# Patient Record
Sex: MALE | Race: WHITE | NOT HISPANIC OR LATINO
[De-identification: names, ages, dates, MRNs, and addresses within clinical notes are randomized per-mention and may not be internally consistent; named-entity substitution may affect disease eponyms.]

---

## 2017-01-12 ENCOUNTER — MEDICATION RENEWAL (OUTPATIENT)
Age: 67
End: 2017-01-12

## 2017-01-27 ENCOUNTER — MEDICATION RENEWAL (OUTPATIENT)
Age: 67
End: 2017-01-27

## 2017-01-30 RX ORDER — ALIROCUMAB 75 MG/ML
75 INJECTION, SOLUTION SUBCUTANEOUS
Qty: 6 | Refills: 3 | Status: DISCONTINUED | COMMUNITY
Start: 2017-01-27 | End: 2017-01-30

## 2017-03-03 ENCOUNTER — APPOINTMENT (OUTPATIENT)
Dept: CARDIOLOGY | Facility: CLINIC | Age: 67
End: 2017-03-03

## 2017-03-03 ENCOUNTER — NON-APPOINTMENT (OUTPATIENT)
Age: 67
End: 2017-03-03

## 2017-03-03 VITALS — SYSTOLIC BLOOD PRESSURE: 140 MMHG | DIASTOLIC BLOOD PRESSURE: 80 MMHG

## 2017-03-03 VITALS
BODY MASS INDEX: 30.61 KG/M2 | DIASTOLIC BLOOD PRESSURE: 77 MMHG | OXYGEN SATURATION: 96 % | SYSTOLIC BLOOD PRESSURE: 155 MMHG | HEIGHT: 72 IN | WEIGHT: 226 LBS | HEART RATE: 68 BPM

## 2017-03-03 DIAGNOSIS — M10.9 GOUT, UNSPECIFIED: ICD-10-CM

## 2017-03-03 RX ORDER — ESCITALOPRAM OXALATE 10 MG/1
10 TABLET, FILM COATED ORAL DAILY
Refills: 0 | Status: DISCONTINUED | COMMUNITY
End: 2017-03-03

## 2017-03-13 ENCOUNTER — RX RENEWAL (OUTPATIENT)
Age: 67
End: 2017-03-13

## 2017-03-13 ENCOUNTER — MEDICATION RENEWAL (OUTPATIENT)
Age: 67
End: 2017-03-13

## 2017-03-15 ENCOUNTER — MEDICATION RENEWAL (OUTPATIENT)
Age: 67
End: 2017-03-15

## 2017-03-16 ENCOUNTER — APPOINTMENT (OUTPATIENT)
Dept: CARDIOLOGY | Facility: CLINIC | Age: 67
End: 2017-03-16

## 2017-03-17 ENCOUNTER — OTHER (OUTPATIENT)
Age: 67
End: 2017-03-17

## 2017-04-24 LAB
A PHAGOCYTOPH IGG TITR SER IF: NORMAL TITER
ALBUMIN SERPL ELPH-MCNC: 4.3 G/DL
ALP BLD-CCNC: 73 U/L
ALT SERPL-CCNC: 19 U/L
ANION GAP SERPL CALC-SCNC: 16 MMOL/L
AST SERPL-CCNC: 16 U/L
B BURGDOR AB SER QL IA: NEGATIVE
B MICROTI IGG TITR SER: NORMAL TITER
BILIRUB SERPL-MCNC: 0.4 MG/DL
BUN SERPL-MCNC: 17 MG/DL
CALCIUM SERPL-MCNC: 9.7 MG/DL
CHLORIDE SERPL-SCNC: 104 MMOL/L
CHOLEST SERPL-MCNC: 156 MG/DL
CHOLEST/HDLC SERPL: 2.9 RATIO
CO2 SERPL-SCNC: 25 MMOL/L
CREAT SERPL-MCNC: 1.09 MG/DL
E CHAFFEENSIS IGG TITR SER IF: NORMAL TITER
GLUCOSE SERPL-MCNC: 117 MG/DL
HBA1C MFR BLD HPLC: 6 %
HDLC SERPL-MCNC: 54 MG/DL
LDLC SERPL CALC-MCNC: 82 MG/DL
POTASSIUM SERPL-SCNC: 4.7 MMOL/L
PROT SERPL-MCNC: 6.7 G/DL
SODIUM SERPL-SCNC: 145 MMOL/L
TRIGL SERPL-MCNC: 101 MG/DL

## 2017-06-09 ENCOUNTER — RX RENEWAL (OUTPATIENT)
Age: 67
End: 2017-06-09

## 2017-07-14 ENCOUNTER — APPOINTMENT (OUTPATIENT)
Dept: DERMATOLOGY | Facility: CLINIC | Age: 67
End: 2017-07-14

## 2017-08-02 ENCOUNTER — RX RENEWAL (OUTPATIENT)
Age: 67
End: 2017-08-02

## 2017-08-18 ENCOUNTER — MEDICATION RENEWAL (OUTPATIENT)
Age: 67
End: 2017-08-18

## 2017-08-29 ENCOUNTER — APPOINTMENT (OUTPATIENT)
Dept: CARDIOLOGY | Facility: CLINIC | Age: 67
End: 2017-08-29
Payer: MEDICARE

## 2017-08-29 ENCOUNTER — NON-APPOINTMENT (OUTPATIENT)
Age: 67
End: 2017-08-29

## 2017-08-29 VITALS
DIASTOLIC BLOOD PRESSURE: 84 MMHG | BODY MASS INDEX: 30.88 KG/M2 | HEART RATE: 78 BPM | OXYGEN SATURATION: 97 % | SYSTOLIC BLOOD PRESSURE: 167 MMHG | WEIGHT: 228 LBS | HEIGHT: 72 IN

## 2017-08-29 VITALS — SYSTOLIC BLOOD PRESSURE: 120 MMHG | DIASTOLIC BLOOD PRESSURE: 78 MMHG

## 2017-08-29 PROCEDURE — 93000 ELECTROCARDIOGRAM COMPLETE: CPT

## 2017-08-29 PROCEDURE — 99214 OFFICE O/P EST MOD 30 MIN: CPT

## 2018-01-11 ENCOUNTER — RX RENEWAL (OUTPATIENT)
Age: 68
End: 2018-01-11

## 2018-03-02 ENCOUNTER — APPOINTMENT (OUTPATIENT)
Dept: CARDIOLOGY | Facility: CLINIC | Age: 68
End: 2018-03-02
Payer: MEDICARE

## 2018-03-02 ENCOUNTER — NON-APPOINTMENT (OUTPATIENT)
Age: 68
End: 2018-03-02

## 2018-03-02 VITALS
HEIGHT: 72 IN | WEIGHT: 230 LBS | SYSTOLIC BLOOD PRESSURE: 145 MMHG | OXYGEN SATURATION: 95 % | DIASTOLIC BLOOD PRESSURE: 82 MMHG | HEART RATE: 65 BPM | BODY MASS INDEX: 31.15 KG/M2

## 2018-03-02 DIAGNOSIS — N13.8 BENIGN PROSTATIC HYPERPLASIA WITH LOWER URINARY TRACT SYMPMS: ICD-10-CM

## 2018-03-02 DIAGNOSIS — E11.42 TYPE 2 DIABETES MELLITUS WITH DIABETIC POLYNEUROPATHY: ICD-10-CM

## 2018-03-02 DIAGNOSIS — N40.1 BENIGN PROSTATIC HYPERPLASIA WITH LOWER URINARY TRACT SYMPMS: ICD-10-CM

## 2018-03-02 DIAGNOSIS — M79.1 MYALGIA: ICD-10-CM

## 2018-03-02 PROCEDURE — 93000 ELECTROCARDIOGRAM COMPLETE: CPT

## 2018-03-02 PROCEDURE — 99214 OFFICE O/P EST MOD 30 MIN: CPT

## 2018-03-02 RX ORDER — ALLOPURINOL 300 MG/1
300 TABLET ORAL DAILY
Refills: 0 | Status: DISCONTINUED | COMMUNITY
End: 2018-03-02

## 2018-03-06 LAB
ALBUMIN SERPL ELPH-MCNC: 4.6 G/DL
ALDOLASE SERPL-CCNC: 6 U/L
ALP BLD-CCNC: 77 U/L
ALT SERPL-CCNC: 14 U/L
ANION GAP SERPL CALC-SCNC: 14 MMOL/L
AST SERPL-CCNC: 15 U/L
BILIRUB SERPL-MCNC: 0.4 MG/DL
BUN SERPL-MCNC: 27 MG/DL
CALCIUM SERPL-MCNC: 9.6 MG/DL
CHLORIDE SERPL-SCNC: 104 MMOL/L
CHOLEST SERPL-MCNC: 174 MG/DL
CHOLEST/HDLC SERPL: 3 RATIO
CK SERPL-CCNC: 161 U/L
CO2 SERPL-SCNC: 26 MMOL/L
CREAT SERPL-MCNC: 1.16 MG/DL
ERYTHROCYTE [SEDIMENTATION RATE] IN BLOOD BY WESTERGREN METHOD: 7 MM/HR
GLUCOSE SERPL-MCNC: 128 MG/DL
HBA1C MFR BLD HPLC: 5.9 %
HDLC SERPL-MCNC: 58 MG/DL
LDLC SERPL CALC-MCNC: 99 MG/DL
POTASSIUM SERPL-SCNC: 4.5 MMOL/L
PROT SERPL-MCNC: 6.8 G/DL
SODIUM SERPL-SCNC: 144 MMOL/L
TRIGL SERPL-MCNC: 85 MG/DL
TSH SERPL-ACNC: 3.12 UIU/ML

## 2018-04-10 ENCOUNTER — RX RENEWAL (OUTPATIENT)
Age: 68
End: 2018-04-10

## 2018-05-04 ENCOUNTER — OTHER (OUTPATIENT)
Age: 68
End: 2018-05-04

## 2018-05-04 ENCOUNTER — APPOINTMENT (OUTPATIENT)
Dept: CHRONIC DISEASE MANAGEMENT | Facility: CLINIC | Age: 68
End: 2018-05-04

## 2018-07-09 ENCOUNTER — RX RENEWAL (OUTPATIENT)
Age: 68
End: 2018-07-09

## 2018-07-24 ENCOUNTER — MOBILE ON CALL (OUTPATIENT)
Age: 68
End: 2018-07-24

## 2018-07-24 RX ORDER — ROSUVASTATIN CALCIUM 20 MG/1
20 TABLET, FILM COATED ORAL DAILY
Qty: 90 | Refills: 0 | Status: COMPLETED | COMMUNITY
End: 2018-07-24

## 2018-08-27 ENCOUNTER — INPATIENT (INPATIENT)
Facility: HOSPITAL | Age: 68
LOS: 0 days | Discharge: HOME | DRG: 287 | End: 2018-08-28
Attending: INTERNAL MEDICINE
Payer: MEDICARE

## 2018-08-27 VITALS
WEIGHT: 229.94 LBS | RESPIRATION RATE: 18 BRPM | OXYGEN SATURATION: 95 % | DIASTOLIC BLOOD PRESSURE: 65 MMHG | HEIGHT: 72 IN | HEART RATE: 65 BPM | SYSTOLIC BLOOD PRESSURE: 137 MMHG | TEMPERATURE: 98 F

## 2018-08-27 DIAGNOSIS — Z90.49 ACQUIRED ABSENCE OF OTHER SPECIFIED PARTS OF DIGESTIVE TRACT: Chronic | ICD-10-CM

## 2018-08-27 DIAGNOSIS — Z98.89 OTHER SPECIFIED POSTPROCEDURAL STATES: Chronic | ICD-10-CM

## 2018-08-27 DIAGNOSIS — Z95.1 PRESENCE OF AORTOCORONARY BYPASS GRAFT: Chronic | ICD-10-CM

## 2018-08-27 DIAGNOSIS — Z95.5 PRESENCE OF CORONARY ANGIOPLASTY IMPLANT AND GRAFT: Chronic | ICD-10-CM

## 2018-08-27 DIAGNOSIS — I20.0 UNSTABLE ANGINA: ICD-10-CM

## 2018-08-27 LAB
ALBUMIN SERPL ELPH-MCNC: 4.3 G/DL — SIGNIFICANT CHANGE UP (ref 3.3–5.2)
ALP SERPL-CCNC: 88 U/L — SIGNIFICANT CHANGE UP (ref 40–120)
ALT FLD-CCNC: 12 U/L — SIGNIFICANT CHANGE UP
ANION GAP SERPL CALC-SCNC: 14 MMOL/L — SIGNIFICANT CHANGE UP (ref 5–17)
AST SERPL-CCNC: 14 U/L — SIGNIFICANT CHANGE UP
BASOPHILS # BLD AUTO: 0 K/UL — SIGNIFICANT CHANGE UP (ref 0–0.2)
BASOPHILS NFR BLD AUTO: 0.3 % — SIGNIFICANT CHANGE UP (ref 0–2)
BILIRUB SERPL-MCNC: 0.3 MG/DL — LOW (ref 0.4–2)
BUN SERPL-MCNC: 21 MG/DL — HIGH (ref 8–20)
CALCIUM SERPL-MCNC: 9.4 MG/DL — SIGNIFICANT CHANGE UP (ref 8.6–10.2)
CHLORIDE SERPL-SCNC: 102 MMOL/L — SIGNIFICANT CHANGE UP (ref 98–107)
CO2 SERPL-SCNC: 27 MMOL/L — SIGNIFICANT CHANGE UP (ref 22–29)
CREAT SERPL-MCNC: 0.88 MG/DL — SIGNIFICANT CHANGE UP (ref 0.5–1.3)
EOSINOPHIL # BLD AUTO: 0.1 K/UL — SIGNIFICANT CHANGE UP (ref 0–0.5)
EOSINOPHIL NFR BLD AUTO: 1.6 % — SIGNIFICANT CHANGE UP (ref 0–6)
GLUCOSE SERPL-MCNC: 115 MG/DL — SIGNIFICANT CHANGE UP (ref 70–115)
HCT VFR BLD CALC: 42.7 % — SIGNIFICANT CHANGE UP (ref 42–52)
HGB BLD-MCNC: 14.1 G/DL — SIGNIFICANT CHANGE UP (ref 14–18)
LYMPHOCYTES # BLD AUTO: 1 K/UL — SIGNIFICANT CHANGE UP (ref 1–4.8)
LYMPHOCYTES # BLD AUTO: 14.1 % — LOW (ref 20–55)
MCHC RBC-ENTMCNC: 28.1 PG — SIGNIFICANT CHANGE UP (ref 27–31)
MCHC RBC-ENTMCNC: 33 G/DL — SIGNIFICANT CHANGE UP (ref 32–36)
MCV RBC AUTO: 85.1 FL — SIGNIFICANT CHANGE UP (ref 80–94)
MONOCYTES # BLD AUTO: 0.4 K/UL — SIGNIFICANT CHANGE UP (ref 0–0.8)
MONOCYTES NFR BLD AUTO: 6.6 % — SIGNIFICANT CHANGE UP (ref 3–10)
NEUTROPHILS # BLD AUTO: 5.2 K/UL — SIGNIFICANT CHANGE UP (ref 1.8–8)
NEUTROPHILS NFR BLD AUTO: 77.1 % — HIGH (ref 37–73)
PLATELET # BLD AUTO: 282 K/UL — SIGNIFICANT CHANGE UP (ref 150–400)
POTASSIUM SERPL-MCNC: 4 MMOL/L — SIGNIFICANT CHANGE UP (ref 3.5–5.3)
POTASSIUM SERPL-SCNC: 4 MMOL/L — SIGNIFICANT CHANGE UP (ref 3.5–5.3)
PROT SERPL-MCNC: 7.2 G/DL — SIGNIFICANT CHANGE UP (ref 6.6–8.7)
RBC # BLD: 5.02 M/UL — SIGNIFICANT CHANGE UP (ref 4.6–6.2)
RBC # FLD: 13.8 % — SIGNIFICANT CHANGE UP (ref 11–15.6)
SODIUM SERPL-SCNC: 143 MMOL/L — SIGNIFICANT CHANGE UP (ref 135–145)
T4 AB SER-ACNC: 7.2 UG/DL — SIGNIFICANT CHANGE UP (ref 4.5–12)
TROPONIN T SERPL-MCNC: <0.01 NG/ML — SIGNIFICANT CHANGE UP (ref 0–0.06)
TSH SERPL-MCNC: 2.31 UIU/ML — SIGNIFICANT CHANGE UP (ref 0.27–4.2)
WBC # BLD: 6.8 K/UL — SIGNIFICANT CHANGE UP (ref 4.8–10.8)
WBC # FLD AUTO: 6.8 K/UL — SIGNIFICANT CHANGE UP (ref 4.8–10.8)

## 2018-08-27 PROCEDURE — 93010 ELECTROCARDIOGRAM REPORT: CPT

## 2018-08-27 PROCEDURE — 71046 X-RAY EXAM CHEST 2 VIEWS: CPT | Mod: 26

## 2018-08-27 PROCEDURE — 99285 EMERGENCY DEPT VISIT HI MDM: CPT

## 2018-08-27 PROCEDURE — 99497 ADVNCD CARE PLAN 30 MIN: CPT | Mod: 25

## 2018-08-27 PROCEDURE — 99223 1ST HOSP IP/OBS HIGH 75: CPT | Mod: GC

## 2018-08-27 RX ORDER — ENOXAPARIN SODIUM 100 MG/ML
40 INJECTION SUBCUTANEOUS DAILY
Qty: 0 | Refills: 0 | Status: DISCONTINUED | OUTPATIENT
Start: 2018-08-28 | End: 2018-08-28

## 2018-08-27 RX ORDER — ACETAMINOPHEN 500 MG
650 TABLET ORAL ONCE
Qty: 0 | Refills: 0 | Status: COMPLETED | OUTPATIENT
Start: 2018-08-27 | End: 2018-08-27

## 2018-08-27 RX ORDER — SODIUM CHLORIDE 9 MG/ML
3 INJECTION INTRAMUSCULAR; INTRAVENOUS; SUBCUTANEOUS ONCE
Qty: 0 | Refills: 0 | Status: COMPLETED | OUTPATIENT
Start: 2018-08-27 | End: 2018-08-27

## 2018-08-27 RX ORDER — ASPIRIN/CALCIUM CARB/MAGNESIUM 324 MG
81 TABLET ORAL DAILY
Qty: 0 | Refills: 0 | Status: DISCONTINUED | OUTPATIENT
Start: 2018-08-28 | End: 2018-08-28

## 2018-08-27 RX ORDER — SODIUM CHLORIDE 9 MG/ML
1000 INJECTION INTRAMUSCULAR; INTRAVENOUS; SUBCUTANEOUS
Qty: 0 | Refills: 0 | Status: DISCONTINUED | OUTPATIENT
Start: 2018-08-27 | End: 2018-08-28

## 2018-08-27 RX ORDER — ATORVASTATIN CALCIUM 80 MG/1
40 TABLET, FILM COATED ORAL AT BEDTIME
Qty: 0 | Refills: 0 | Status: DISCONTINUED | OUTPATIENT
Start: 2018-08-27 | End: 2018-08-28

## 2018-08-27 RX ORDER — CLOPIDOGREL BISULFATE 75 MG/1
75 TABLET, FILM COATED ORAL DAILY
Qty: 0 | Refills: 0 | Status: DISCONTINUED | OUTPATIENT
Start: 2018-08-27 | End: 2018-08-28

## 2018-08-27 RX ORDER — METOPROLOL TARTRATE 50 MG
25 TABLET ORAL DAILY
Qty: 0 | Refills: 0 | Status: DISCONTINUED | OUTPATIENT
Start: 2018-08-28 | End: 2018-08-28

## 2018-08-27 RX ORDER — METOPROLOL TARTRATE 50 MG
25 TABLET ORAL ONCE
Qty: 0 | Refills: 0 | Status: COMPLETED | OUTPATIENT
Start: 2018-08-27 | End: 2018-08-27

## 2018-08-27 RX ORDER — FINASTERIDE 5 MG/1
1 TABLET, FILM COATED ORAL
Qty: 0 | Refills: 0 | COMMUNITY

## 2018-08-27 RX ADMIN — SODIUM CHLORIDE 3 MILLILITER(S): 9 INJECTION INTRAMUSCULAR; INTRAVENOUS; SUBCUTANEOUS at 15:08

## 2018-08-27 RX ADMIN — Medication 25 MILLIGRAM(S): at 21:42

## 2018-08-27 RX ADMIN — ATORVASTATIN CALCIUM 40 MILLIGRAM(S): 80 TABLET, FILM COATED ORAL at 21:41

## 2018-08-27 RX ADMIN — CLOPIDOGREL BISULFATE 75 MILLIGRAM(S): 75 TABLET, FILM COATED ORAL at 21:41

## 2018-08-27 RX ADMIN — Medication 650 MILLIGRAM(S): at 21:41

## 2018-08-27 RX ADMIN — Medication 650 MILLIGRAM(S): at 22:37

## 2018-08-27 NOTE — ED STATDOCS - PSH
H/O coronary artery bypass surgery  x4 vessels  S/P appendectomy    S/P cholecystectomy    S/P drug eluting coronary stent placement    S/P eye surgery

## 2018-08-27 NOTE — ED PROVIDER NOTE - OBJECTIVE STATEMENT
67 year old male with pmh including cad s/p stent x 3 and cabg x 4, hld, ht, hyperthyroidism, hx of left eye vision problems secondary to previous trauma coming to  ED with increasing SOB x 1 week. Patient stating starting today has been having chest pressure in center of chest that radiated to left upper arm as well. states normally able to work out and lift weights and does so 3 times a week but currently tough secondary to sob. states last time had similar symptoms had 2 stents placed.

## 2018-08-27 NOTE — ED PROVIDER NOTE - PROGRESS NOTE DETAILS
Cardiology paged. labs reviewed; given patient hx of stents last time with similar symptoms awaiting call back from cardiology spoke to cardiology Dr Bates will see patient Dr Vega spoke to Dr Bates patient for cath in AM; spoke to Elvis will admit patient for unstable angina; trop q8 ordered x 2

## 2018-08-27 NOTE — ED PROVIDER NOTE - CARE PLAN
Principal Discharge DX:	Unstable angina  Goal:	to not have further sob or cp  Assessment and plan of treatment:	admit to hospital for cath in am

## 2018-08-27 NOTE — ED ADULT NURSE NOTE - NSIMPLEMENTINTERV_GEN_ALL_ED
Implemented All Universal Safety Interventions:  [place] to call system. Call bell, personal items and telephone within reach. Instruct patient to call for assistance. Room bathroom lighting operational. Non-slip footwear when patient is off stretcher. Physically safe environment: no spills, clutter or unnecessary equipment. Stretcher in lowest position, wheels locked, appropriate side rails in place.

## 2018-08-27 NOTE — H&P ADULT - HISTORY OF PRESENT ILLNESS
67 year old male with PMH including CAD s/p stent x 3 (2014, 2016) and CABG (2013), HLD, HTN, Hyperthyroidism (Grave's Disease), coming to  ED with increasing chest pain x2-3 days and SOB. Patient states that 2 weeks ago he started having intermittent SOB on exertion/light lifting which has gotten worse over the past week, Patient also with central chest pain starting 3 days ago, described as moderate-severe tightening and pressure, radiating to left arm, which was initially on minimal exertion but now at rest, partially relieved when he lies down. Patient states normally able to work out and lift weights and does so 3 times a week but not able to over past 2 weeks. Patient also admits to having some PND, denies orthopnea, fevers, chills, palpitations abdominal pain, constipation, diarrhea, sick contacts, recent travel, cocaine use.

## 2018-08-27 NOTE — ED PROVIDER NOTE - FAMILY HISTORY
Family history of coronary artery disease     Family history of acute myocardial infarction     Sibling  Still living? Yes, Estimated age: 51-60  Family history of coronary artery disease, Age at diagnosis: 51-60

## 2018-08-27 NOTE — ED ADULT NURSE REASSESSMENT NOTE - NS ED NURSE REASSESS COMMENT FT1
patient resting comfortably in bed.  Denies any pain or discomfort at this time.  Will continue to monitor.

## 2018-08-27 NOTE — CONSULT NOTE ADULT - SUBJECTIVE AND OBJECTIVE BOX
CARDIOLOGY CONSULTATION NOTE ([facility])  Consult requested by:      Reason for Consultation:     History obtained by: Patient and medical record     obtained: No    Chief complaint:    Patient is a 67y old  Male who presents with a chief complaint of chest pain    HPI:    68 y/o male with PMHx of CAD s/p prior CABG (LIMA to LAD, SVG to OM1 occluded in 2016, SVG to RCA , SVG to D1 ) s/p 2 MARCELINA to LM and CX.   He presented with 2 weeks history of recurrent episodes of retrosternal chest tightness radiating to the left shoulder , unrelated to exertion , associated with SOB, lasted for minutes sometimes up to 1 hour. Symptoms similar to his symptoms prior to last PCI .   He has PMHx of HTN, Hyperlipidemia.    REVIEW OF SYMPTOMS: Cardiovascular:  as per HPI;  Respiratory:  as per HPI  Genitourinary:  No dysuria, no hematuria; Gastrointestinal:  No nausea, no vomiting. No diarrhea.  No abdominal pain. No dark color stool, no melena ; Neurological: No headache, no dizziness, no slurred speech;  Psychiatric: No agitation, no anxiety.  ALL OTHER REVIEW OF SYSTEMS ARE NEGATIVE.    ALLERGIES: Allergies    No Known Allergies    Intolerances    Home Medications:   * Patient Currently Takes Medications as of 08-Apr-2016 09:06 documented in Structured Notes  · 	aspirin 325 mg oral tablet: 1 tab(s) orally once a day  · 	pantoprazole 40 mg oral delayed release tablet: 1 tab(s) orally once a day (before a meal)  · 	metoprolol: 12.5 milligram(s) orally once a day  · 	Plavix 75 mg oral tablet: 1 tab(s) orally once a day  · 	atorvastatin 40 mg oral tablet: 1 tab(s) orally once a day (at bedtime)  · 	methimazole 5 mg oral tablet:  orally once a day    PAST MEDICAL HISTORY  Left eye trauma  Renal colic  Hypertension  Hypercholesterolemia  Coronary artery disease  Hyperthyroidism      PAST SURGICAL HISTORY  S/P cholecystectomy  S/P drug eluting coronary stent placement  S/P eye surgery  S/P appendectomy  H/O coronary artery bypass surgery      FAMILY HISTORY:  Family history of coronary artery disease (Sibling)  Family history of acute myocardial infarction  Family history of coronary artery disease      SOCIAL HISTORY:  former smoker      Vital Signs Last 24 Hrs  T(C): 36.7 (27 Aug 2018 19:17), Max: 36.7 (27 Aug 2018 14:01)  T(F): 98 (27 Aug 2018 19:17), Max: 98.1 (27 Aug 2018 14:01)  HR: 63 (27 Aug 2018 19:17) (55 - 65)  BP: 155/74 (27 Aug 2018 19:17) (137/65 - 155/74)  BP(mean): --  RR: 18 (27 Aug 2018 19:17) (18 - 18)  SpO2: 97% (27 Aug 2018 19:17) (95% - 97%)      PHYSICAL EXAM:  Constitutional: Comfortable . No acute distress.   HEENT: Atraumatic and normcephalic , neck is supple . no JVD. No carotid bruit.   CNS: A&Ox3. No focal deficits.  Lymph Nodes: Cervical : Not palpable.  Respiratory: CTAB  Cardiovascular: S1S2 RRR. No murmur/rubs or gallop.  Gastrointestinal: Soft non-tender and non distended . +Bowel sounds, no HSM  Extremities: No edema , DP and PT +2   Psychiatric: Calm . no agitation., mood appropriate  Skin: No skin lesions    Intake and output:     LABS:                        14.1   6.8   )-----------( 282      ( 27 Aug 2018 15:44 )             42.7     08-27    143  |  102  |  21.0<H>  ----------------------------<  115  4.0   |  27.0  |  0.88    Ca    9.4      27 Aug 2018 15:44    TPro  7.2  /  Alb  4.3  /  TBili  0.3<L>  /  DBili  x   /  AST  14  /  ALT  12  /  AlkPhos  88  08-27    CARDIAC MARKERS ( 27 Aug 2018 15:44 )  x     / <0.01 ng/mL / x     / x     / x        ;p-BNP=      ECG: pending    RADIOLOGY & ADDITIONAL STUDIES:    X-ray:   reviewed by me , no cardiomegaly, no pulmonary congestion        Catheterization Findings  VENTRICLES: EF estimated was 45 %.  CORONARY VESSELS: The coronary circulation is right dominant.  LM:   --  LM: There was a 70 % stenosis.  LAD:   --  Proximal LAD: There was a diffuse 90 % stenosis.  --  Mid LAD: There was a 100 % stenosis.  --  Distal LAD: There was a 60 % stenosis. Fills via LIMA.  CX:   --  OM1: There was a 70 % stenosis.  RI:   --  Ramus intermedius: There was a 95 % stenosis.  RCA:   --  Proximal RCA: There was a diffuse 60 % stenosis.  --  Distal RCA: There was a 100 % stenosis. Fills via SVG.  GRAFTS:   --  Graft to the LAD: The graft was a LIMA. Graft angiography  showed no evidence of disease.  --  Graft to the 1st diagonal: The graft was a saphenous vein graft. Graft  angiography showed no evidence of disease.  --  Graft to the 1st obtuse marginal: The graft was a saphenous vein graft.  Occluded.  --  Graft to the distal RCA: The graft was a saphenous vein graft. Graft  angiography showed no evidence of disease.  COMPLICATIONS: No complications occurred during the cath lab visit.  DIAGNOSTIC IMPRESSIONS: Severe multivessel disease. Patent LIMA to LAD, SVG  to Diagonal and SVG to RCA. Occluded SVG to circumflex. New distal OM  disease- PCI with 1 MARCELINA. New left main disease (by IVUS) - PCI with 1 MARCELINA.  LVEDP [id number].  DIAGNOSTIC RECOMMENDATIONS: Aspirin and Plavix.  If recurrent sxs of CHF, consider low dose diuretic.  INTERVENTIONAL IMPRESSIONS: Severe multivessel disease. Patent LIMA to LAD,  SVG to Diagonal and SVG to RCA. Occluded SVG to circumflex. New distal OM  disease- PCI with 1 MARCELINA. New left main disease (by IVUS) - PCI with 1 MARCELINA.  LVEDP [id number].  INTERVENTIONAL RECOMMENDATIONS: Aspirin and Plavix.  If recurrent sxs of CHF, consider low dose diuretic.  Prepared and signed by  Nasir Michael MD

## 2018-08-27 NOTE — ED STATDOCS - PMH
Coronary artery disease    Hypercholesterolemia    Hypertension    Hyperthyroidism    Left eye trauma  limited vision  Renal colic

## 2018-08-27 NOTE — H&P ADULT - NSHPPHYSICALEXAM_GEN_ALL_CORE
T(C): 36.7 (08-27-18 @ 19:17), Max: 36.7 (08-27-18 @ 14:01)  HR: 58 (08-27-18 @ 21:43) (55 - 65)  BP: 158/74 (08-27-18 @ 21:43) (137/65 - 158/74)  RR: 18 (08-27-18 @ 21:43) (18 - 18)  SpO2: 98% (08-27-18 @ 21:43) (95% - 98%)    Physical Exam:   GENERAL: well-groomed, well-developed, NAD  HEENT: head NC/AT; EOM intact, PERRLA, conjunctiva & sclera clear; hearing grossly intact, no nasal congestion or discharge, no sinus tenderness, no tonsillar erythema or exudates, moist mucous membranes, good dentition  NECK: supple, no JVD, no thyromegaly  RESPIRATORY: CTA B/L, no wheezing, rales, rhonchi or rubs  CARDIOVASCULAR: S1&S2, RRR, no murmurs or gallops  ABDOMEN: soft, non-tender, non-distended, BS+, no hernias, no hepatosplenomegaly, no CVA tenderness  MUSCULOSKELETAL: no muscle atrophy, no clubbing, cyanosis or edema of extremities, no calf tenderness   LYMPH: no lymphadenopathy  VASCULAR: peripheral pulses 2+, capillary refill < 2 seconds, no varicose veins   SKIN: No rashes, bruises or scars   NEUROLOGIC: AA&O X3, CN2-12 intact w/ no focal deficits, no sensory loss, motor Strength 5/5 in UE & LE B/L, DTRs 2+/4 intact B/L, normal gait T(C): 36.7 (08-27-18 @ 19:17), Max: 36.7 (08-27-18 @ 14:01)  HR: 58 (08-27-18 @ 21:43) (55 - 65)  BP: 158/74 (08-27-18 @ 21:43) (137/65 - 158/74)  RR: 18 (08-27-18 @ 21:43) (18 - 18)  SpO2: 98% (08-27-18 @ 21:43) (95% - 98%)    Physical Exam:   GENERAL: well-groomed, well-developed, NAD  HEENT: head NC/AT; EOM intact, PERRLA, conjunctiva & sclera clear; hearing grossly intact, no nasal congestion or discharge, no sinus tenderness, no tonsillar erythema or exudates, moist mucous membranes, good dentition  NECK: supple, no JVD, no thyromegaly  RESPIRATORY: CTA B/L, no wheezing, rales, rhonchi or rubs  CARDIOVASCULAR: S1&S2, RRR, no murmurs or gallops  GI: soft, non-tender, non-distended, BS+, no hernias, no hepatosplenomegaly, no CVA tenderness  MUSCULOSKELETAL: no muscle atrophy, no clubbing, cyanosis or edema of extremities, no calf tenderness   LYMPH: no lymphadenopathy  VASCULAR: peripheral pulses 2+, capillary refill < 2 seconds, no varicose veins   SKIN: No rashes, bruises or scars   NEUROLOGIC: AA&O X3, CN2-12 intact w/ no focal deficits, no sensory loss, motor Strength 5/5 in UE & LE B/L, DTRs 2+/4 intact B/L, normal gait

## 2018-08-27 NOTE — ED PROVIDER NOTE - EYES NEGATIVE STATEMENT, MLM
no discharge, no irritation, no pain, no redness, and no visual changes. no discharge, no irritation, no pain, no redness, and no visual changes (has left eye vision problems from childhood injury).

## 2018-08-27 NOTE — H&P ADULT - NSHPSOCIALHISTORY_GEN_ALL_CORE
Previous smoker for 5-10 years, last smoke 30+ years ago, Occasional alcohol use, denies any illicit drug use.

## 2018-08-27 NOTE — ED ADULT NURSE NOTE - OBJECTIVE STATEMENT
c/o chest pain radiating to Left shoulder.  reports hx of CABGx4 in 2013 and cardiac stents placed after that.  A+ox4, no s/s of distress, denies discomfort at this time.  no short of breath, no dizziness.  No edema noted

## 2018-08-27 NOTE — H&P ADULT - ASSESSMENT
67 year old male with PMH including CAD s/p stent x 3 (2014, 2016) and CABG (2013), HLD, HTN, Hyperthyroidism (Grave's Disease), coming to  ED with increasing chest pain x2-3 days and SOB, admitted to rule out ACS. Patient evaluated by  cardiology in the ED, likely for cardiac cath in the AM.    Unstable Angina  >Admit to medicine-resident service under Dr. Hamilton  >Bed:  Telemetry  >Diet:  DASH/TLC, NPO after midnight tonight for cath in AM  >Activity: Bedrest for now  >Nursing: vitals per routine  >IV fluids: NS at 125 cc/hr, BUN elevated and  in anticipation of contrast for procedure tomorrow  >Labs: CBC, CMP, PT/INR, PTT, Mg, Phos in AM  >Imaging: CXR unremarkable  >Consults: [facility]  > O2 to maintain O2 Sat > 92%  > Aspirin 81mg PO (patient already took today), Lipitor 40 mg, Metoprolol 25 mg, lisinopril if BP remains elevated   > Pain control: nitroglycerin 0.4mg SL PRN chest pain x3  > serial cardiac enzymes, troponins negative x1, will trend  Q6-8H x3, HbA1c, lipid panel  > EKG, TTE    CAD  s/p CABG in 2014, stents x3 last in 2016   c/w with ASA, Plavix BB, statin  Management as per above    Hyperthyroidism 2/2 Graves Disease  check TSH and T4  c/w home Methimazole 5mg    Preventative Measure:  DVT ppx: SCD while in bed, Lovenox 40 mg 67 year old male with PMH including CAD s/p stent x 3 (2014, 2016) and CABG (2013), HLD, HTN, Hyperthyroidism (Grave's Disease), coming to  ED with increasing chest pain x2-3 days and SOB, admitted to rule out ACS. Patient evaluated by  cardiology in the ED, likely for cardiac cath in the AM.    Unstable Angina  >Admit to medicine-resident service under Dr. Hamilton  >Bed:  Telemetry  >Diet:  DASH/TLC, NPO after midnight tonight for cath in AM  >Activity: Bedrest for now  >Nursing: vitals per routine  >IV fluids: NS at 125 cc/hr, BUN elevated and  in anticipation of contrast for procedure tomorrow  >Labs: CBC, CMP, PT/INR, PTT, Mg, Phos in AM  >Imaging: CXR unremarkable  >Consults: [facility]  > O2 to maintain O2 Sat > 92%  > Aspirin 81mg PO (patient already took today), Lipitor 40 mg, Metoprolol 25 mg, lisinopril if BP remains elevated   > Pain control: nitroglycerin 0.4mg SL PRN chest pain x3  > serial cardiac enzymes, troponins negative x1, will trend  Q6-8H x3, HbA1c, lipid panel  > EKG, TTE    CAD  s/p CABG in 2014, stents x3 last in 2016   c/w with ASA, Plavix BB, statin  Management as per above    Hyperthyroidism 2/2 Graves Disease  TSH and T4 wnl  c/w home Methimazole 5mg    Preventative Measure:  DVT ppx: SCD while in bed, Lovenox 40 mg 67 year old male with PMH including CAD s/p stent x 3 (2014, 2016) and CABG (2013), HLD, HTN, Hyperthyroidism (Grave's Disease), coming to  ED with increasing chest pain x2-3 days and SOB, admitted to rule out ACS. Patient evaluated by  cardiology in the ED, likely for cardiac cath in the AM.    Unstable Angina with moderate risk for ACS  >Admit to medicine-resident service under Dr. Hamilton  >Bed:  Telemetry  >Diet:  DASH/TLC, NPO after midnight tonight for cath in AM  >Activity: Bedrest for now  >Nursing: vitals per routine  >IV fluids: NS at 125 cc/hr, BUN elevated and  in anticipation of contrast for procedure tomorrow  >Labs: CBC, CMP, PT/INR, PTT, Mg, Phos in AM  >Imaging: CXR unremarkable  >Consults:  Cardiology  > O2 to maintain O2 Sat > 92%  > Aspirin 81mg PO (patient already took today), Lipitor 40 mg, Metoprolol 25 mg, lisinopril if BP remains elevated   > Pain control: nitroglycerin 0.4mg SL PRN chest pain x3  > serial cardiac enzymes, troponins negative x1, will trend  Q6-8H x3, HbA1c, lipid panel  > EKG, TTE    CAD s/p PCI and CABG  s/p CABG in 2014, stents x3 last in 2016   c/w with ASA, Plavix BB, statin  Management as per above    Hyperthyroidism 2/2 Graves Disease  TSH and T4 wnl  c/w home Methimazole 5mg    Preventative Measure:  DVT ppx: SCD while in bed, heparin sq q8hrs 67 year old male with PMH including CAD s/p stent x 3 (2014, 2016) and CABG (2013), HLD, HTN, Hyperthyroidism (Grave's Disease), coming to  ED with increasing chest pain x2-3 days and SOB, admitted to rule out ACS. Patient evaluated by  cardiology in the ED, likely for cardiac cath in the AM.    Unstable Angina with moderate risk for ACS  >Admit to medicine service  >Bed:  Telemetry  >Diet:  DASH/TLC, NPO after midnight tonight for cath in AM  >Activity: Bedrest for now  >Nursing: vitals per routine  >IV fluids: NS at 125 cc/hr, BUN elevated and  in anticipation of contrast for procedure tomorrow  >Labs: CBC, CMP, PT/INR, PTT, Mg, Phos in AM  >Imaging: CXR unremarkable  >Consults:  Cardiology  > O2 to maintain O2 Sat > 92%  > Aspirin 81mg PO (patient already took today), Lipitor 40 mg, Metoprolol 25 mg, lisinopril if BP remains elevated   > Pain control: nitroglycerin 0.4mg SL PRN chest pain x3  > serial cardiac enzymes, troponins negative x1, will trend  Q6-8H x3, HbA1c, lipid panel  > EKG, TTE    CAD s/p PCI and CABG  s/p CABG in 2014, stents x3 last in 2016   c/w with ASA, Plavix BB, statin  Management as per above    Hyperthyroidism 2/2 Graves Disease  TSH and T4 wnl  c/w home Methimazole 5mg    Preventative Measure:  DVT ppx: SCD while in bed, heparin sq q8hrs

## 2018-08-27 NOTE — ED STATDOCS - PROGRESS NOTE DETAILS
68 y/o male with a hx of CAD, HTN, coronary artery bypass, and stent placement presents to the ED c/o CP which onset today. He also notes that he was SOB since last week. Pt notes mild chest discomfort at this time. Pt to be moved to main ED. Protocol orders entered.

## 2018-08-27 NOTE — H&P ADULT - ATTENDING COMMENTS
30 min time spent discussing advanced care planning including code status, existence of health care proxy, plan of treatment, consultants if called, and prognosis. Pt in agreement with above, all questions answered and concerns addressed.    FULL CODE  agrees to cardiac cath in AM  agrees to NPO

## 2018-08-27 NOTE — ED PROVIDER NOTE - MEDICAL DECISION MAKING DETAILS
labs reviewed; given patient hx of stents last time with similar symptoms awaiting call back from cardiology

## 2018-08-27 NOTE — ED PROVIDER NOTE - ATTENDING CONTRIBUTION TO CARE
58 year old with hx of HTN and CAD s/p 3 stent placement in 2016 who presents to the ER c/o intermittent chest pain.  The pain began about 1 week ago as chest pain with exertion but now patient states for the past day he has been experiencing chest pain at rest associated with mild SOB.  He states that the pain is similar to when he needed stents a couple years ago.  Patient denies fever and URI symptoms.  He is well appearing, no acute distress, lungs clear, no hypoxia.  Due to patient's hx of CAD cardiology was consulted.  EKG non-ischemic, 1st set negative.  Dr. Mercado at bedside recommeds admission for cardiac catheterization tomorrow.

## 2018-08-27 NOTE — CONSULT NOTE ADULT - ASSESSMENT
66 y/o male with progressive anginal symptoms and SOB FC III , with history of CAD s/p CABG x 4 ( occluded SVG to OM1 , other grafts patent) s/p PCI to LM and CX   TNI -ve , obtain a 2nd set, continuous telemetry,   Unstable angina for LHC/coronary angiogram tomorrow.    HTN uncontrolled, continue current meds BB , can add lisinopril 20 mg daily if BP remains uncontrolled    Hyperlipidemia continue atorvastatin 40 mg

## 2018-08-27 NOTE — ED PROVIDER NOTE - SKIN, MLM
Skin normal color for race, warm, dry and intact. No evidence of rash.; +healed midline chest scar seen secondary to open heart surgery; +tattoos

## 2018-08-28 ENCOUNTER — TRANSCRIPTION ENCOUNTER (OUTPATIENT)
Age: 68
End: 2018-08-28

## 2018-08-28 VITALS
HEART RATE: 52 BPM | OXYGEN SATURATION: 98 % | RESPIRATION RATE: 16 BRPM | SYSTOLIC BLOOD PRESSURE: 130 MMHG | DIASTOLIC BLOOD PRESSURE: 83 MMHG

## 2018-08-28 LAB
ANION GAP SERPL CALC-SCNC: 11 MMOL/L — SIGNIFICANT CHANGE UP (ref 5–17)
APTT BLD: 31.2 SEC — SIGNIFICANT CHANGE UP (ref 27.5–37.4)
BUN SERPL-MCNC: 19 MG/DL — SIGNIFICANT CHANGE UP (ref 8–20)
CALCIUM SERPL-MCNC: 8.8 MG/DL — SIGNIFICANT CHANGE UP (ref 8.6–10.2)
CHLORIDE SERPL-SCNC: 103 MMOL/L — SIGNIFICANT CHANGE UP (ref 98–107)
CHOLEST SERPL-MCNC: 148 MG/DL — SIGNIFICANT CHANGE UP (ref 110–199)
CO2 SERPL-SCNC: 27 MMOL/L — SIGNIFICANT CHANGE UP (ref 22–29)
CREAT SERPL-MCNC: 0.9 MG/DL — SIGNIFICANT CHANGE UP (ref 0.5–1.3)
GLUCOSE SERPL-MCNC: 106 MG/DL — SIGNIFICANT CHANGE UP (ref 70–115)
HBA1C BLD-MCNC: 5.8 % — HIGH (ref 4–5.6)
HCT VFR BLD CALC: 42.1 % — SIGNIFICANT CHANGE UP (ref 42–52)
HDLC SERPL-MCNC: 50 MG/DL — SIGNIFICANT CHANGE UP
HGB BLD-MCNC: 13.6 G/DL — LOW (ref 14–18)
INR BLD: 1.07 RATIO — SIGNIFICANT CHANGE UP (ref 0.88–1.16)
LIPID PNL WITH DIRECT LDL SERPL: 76 MG/DL — SIGNIFICANT CHANGE UP
MAGNESIUM SERPL-MCNC: 2.1 MG/DL — SIGNIFICANT CHANGE UP (ref 1.6–2.6)
MCHC RBC-ENTMCNC: 27.6 PG — SIGNIFICANT CHANGE UP (ref 27–31)
MCHC RBC-ENTMCNC: 32.3 G/DL — SIGNIFICANT CHANGE UP (ref 32–36)
MCV RBC AUTO: 85.6 FL — SIGNIFICANT CHANGE UP (ref 80–94)
PHOSPHATE SERPL-MCNC: 3.7 MG/DL — SIGNIFICANT CHANGE UP (ref 2.4–4.7)
PLATELET # BLD AUTO: 255 K/UL — SIGNIFICANT CHANGE UP (ref 150–400)
POTASSIUM SERPL-MCNC: 4 MMOL/L — SIGNIFICANT CHANGE UP (ref 3.5–5.3)
POTASSIUM SERPL-SCNC: 4 MMOL/L — SIGNIFICANT CHANGE UP (ref 3.5–5.3)
PROTHROM AB SERPL-ACNC: 11.8 SEC — SIGNIFICANT CHANGE UP (ref 9.8–12.7)
RBC # BLD: 4.92 M/UL — SIGNIFICANT CHANGE UP (ref 4.6–6.2)
RBC # FLD: 13.9 % — SIGNIFICANT CHANGE UP (ref 11–15.6)
SODIUM SERPL-SCNC: 141 MMOL/L — SIGNIFICANT CHANGE UP (ref 135–145)
TOTAL CHOLESTEROL/HDL RATIO MEASUREMENT: 3 RATIO — LOW (ref 3.4–9.6)
TRIGL SERPL-MCNC: 112 MG/DL — SIGNIFICANT CHANGE UP (ref 10–200)
TROPONIN T SERPL-MCNC: <0.01 NG/ML — SIGNIFICANT CHANGE UP (ref 0–0.06)
TROPONIN T SERPL-MCNC: <0.01 NG/ML — SIGNIFICANT CHANGE UP (ref 0–0.06)
WBC # BLD: 6.3 K/UL — SIGNIFICANT CHANGE UP (ref 4.8–10.8)
WBC # FLD AUTO: 6.3 K/UL — SIGNIFICANT CHANGE UP (ref 4.8–10.8)

## 2018-08-28 PROCEDURE — 99238 HOSP IP/OBS DSCHRG MGMT 30/<: CPT

## 2018-08-28 PROCEDURE — 93010 ELECTROCARDIOGRAM REPORT: CPT

## 2018-08-28 RX ORDER — NITROGLYCERIN 6.5 MG
0.4 CAPSULE, EXTENDED RELEASE ORAL
Qty: 0 | Refills: 0 | Status: DISCONTINUED | OUTPATIENT
Start: 2018-08-28 | End: 2018-08-28

## 2018-08-28 RX ORDER — HEPARIN SODIUM 5000 [USP'U]/ML
5000 INJECTION INTRAVENOUS; SUBCUTANEOUS EVERY 8 HOURS
Qty: 0 | Refills: 0 | Status: DISCONTINUED | OUTPATIENT
Start: 2018-08-28 | End: 2018-08-28

## 2018-08-28 RX ORDER — LISINOPRIL 2.5 MG/1
20 TABLET ORAL DAILY
Qty: 0 | Refills: 0 | Status: DISCONTINUED | OUTPATIENT
Start: 2018-08-28 | End: 2018-08-28

## 2018-08-28 RX ADMIN — CLOPIDOGREL BISULFATE 75 MILLIGRAM(S): 75 TABLET, FILM COATED ORAL at 07:13

## 2018-08-28 RX ADMIN — Medication 81 MILLIGRAM(S): at 07:13

## 2018-08-28 RX ADMIN — LISINOPRIL 20 MILLIGRAM(S): 2.5 TABLET ORAL at 03:39

## 2018-08-28 NOTE — DISCHARGE NOTE ADULT - CARE PLAN
Principal Discharge DX:	Coronary artery disease  Goal:	optimal cardiac function  Assessment and plan of treatment:	No heavy lifting, driving, sex, tub baths, swimming, or any activity that submerges the lower half of the body in water for 48 hours.  Limited walking and stairs for 48 hours.    Change the bandaid after 24 hours and every 24 hours after that.  Keep the puncture site dry and covered with a bandaid until a scab forms.    Observe the site frequently.  If bleeding or a large lump (the size of a golf ball or bigger) occurs lie flat, apply continuous direct pressure just above the puncture site for at least 10 minutes, and notify your physician immediately.  If the bleeding cannot be controlled, call 911 immediately for assistance.  Notify your physician of pain, swelling or any drainage.    Notify your physician immediately if coldness, numbness, discoloration or pain in your foot occurs. Principal Discharge DX:	Coronary artery disease  Goal:	optimal cardiac function  Assessment and plan of treatment:	No heavy lifting, driving, sex, tub baths, swimming, or any activity that submerges the lower half of the body in water for 48 hours.  Limited walking and stairs for 48 hours.    Change the bandaid after 24 hours and every 24 hours after that.  Keep the puncture site dry and covered with a bandaid until a scab forms.    Observe the site frequently.  If bleeding or a large lump (the size of a golf ball or bigger) occurs lie flat, apply continuous direct pressure just above the puncture site for at least 10 minutes, and notify your physician immediately.  If the bleeding cannot be controlled, call 911 immediately for assistance.  Notify your physician of pain, swelling or any drainage.    Notify your physician immediately if coldness, numbness, discoloration or pain in your foot occurs.  Secondary Diagnosis:	Hypertension  Assessment and plan of treatment:	c/w home medication  Secondary Diagnosis:	Hypercholesterolemia  Assessment and plan of treatment:	c/w statin  Secondary Diagnosis:	Hyperthyroidism  Assessment and plan of treatment:	c/w home medication

## 2018-08-28 NOTE — DISCHARGE NOTE ADULT - MEDICATION SUMMARY - MEDICATIONS TO TAKE
I will START or STAY ON the medications listed below when I get home from the hospital:    aspirin 81 mg oral tablet, chewable  -- 1 tab(s) by mouth once a day  -- Indication: For antiplatelet    rosuvastatin 20 mg oral tablet  -- 1 tab(s) by mouth once a day (at bedtime)  -- Indication: For hyperlipdiemia    Plavix 75 mg oral tablet  -- 1 tab(s) by mouth once a day  -- Indication: For antiplatelet    methimazole 5 mg oral tablet  --  by mouth once a day  -- Indication: For thyroid    metoprolol succinate 25 mg oral tablet, extended release  -- 1 tab(s) by mouth once a day  -- Indication: For heart

## 2018-08-28 NOTE — DISCHARGE NOTE ADULT - PATIENT PORTAL LINK FT
You can access the [facility] Patient Portal, offered by [facility], by registering with the following website: [email or web address]

## 2018-08-28 NOTE — PROGRESS NOTE ADULT - SUBJECTIVE AND OBJECTIVE BOX
Nurse Practitioner Progress note:     INTERVAL HISTORY: 67 year old male with significant cardiac history with c/o chest pain.    MEDICATIONS:  lisinopril 20 milliGRAM(s) Oral daily  metoprolol succinate ER 25 milliGRAM(s) Oral daily  nitroglycerin     SubLingual 0.4 milliGRAM(s) SubLingual every 5 minutes PRN  atorvastatin 40 milliGRAM(s) Oral at bedtime  methimazole 5 milliGRAM(s) Oral daily  aspirin  chewable 81 milliGRAM(s) Oral daily  clopidogrel Tablet 75 milliGRAM(s) Oral daily  heparin  Injectable 5000 Unit(s) SubCutaneous every 8 hours      TELEMETRY: SB 52 bpm    T(C): 36.7 (08-28-18 @ 06:45), Max: 36.7 (08-27-18 @ 14:01)  HR: 54 (08-28-18 @ 09:45) (53 - 86)  BP: 134/62 (08-28-18 @ 09:45) (134/62 - 167/64)  RR: 18 (08-28-18 @ 09:45) (18 - 20)  SpO2: 98% (08-28-18 @ 09:45) (95% - 98%)  Wt(kg): --    PHYSICAL EXAM:  Appearance: Normal	  HEENT:   Normal oral mucosa, PERRL  Cardiovascular: Normal S1 S2, No JVD, No murmurs, No edema  Respiratory: Lungs clear to auscultation	  Psychiatry: A & O x 3, Mood & affect appropriate  Gastrointestinal:  Soft, Non-tender, + BS	  Skin: No rashes, No ecchymoses, No cyanosis  Neurologic: Non-focal, A&O X3.  No neuro deficits  Extremities: Normal range of motion, No clubbing, cyanosis or edema  Vascular: Peripheral pulses palpable 2+ bilaterally  Procedure Site: Right groin site benign.  No bleeding/hematoma/ecchymosis.  + palp pedal pulse.      PROCEDURE RESULTS: S/P LHC which revealed patent grafts and stents via right groin  No treatment required. (full report to follow)    ASSESSMENT/PLAN: 	  -Groin precautions  -Bedrest X 2h  -Resume home meds  -Follow up with Dr. Oviedo  -Pt cleared for discharge from cardiac standpoint  -Spoke with Dr. Bright and will discharge patient today.

## 2018-08-28 NOTE — DISCHARGE NOTE ADULT - HOSPITAL COURSE
67 year old male with h/o CABG and cardiac stents who c/o chest pain.  Now s/p LHC which revealed patent grafts and stents.  No treatment required. Cont home meds.  Discharge to home This is 67 year old male with h/o CABG and cardiac stents who c/o chest pain, serial troponin negative,  Now s/p LHC which revealed patent grafts and stents, feeling better, denied chest pain, SOB, clear by cardiology to discharge home. Pt wife present bedside, updated, answer all questions.    ICU Vital Signs Last 24 Hrs  T(C): 36.7 (28 Aug 2018 06:45), Max: 36.7 (27 Aug 2018 14:01)  T(F): 98 (28 Aug 2018 06:45), Max: 98.1 (27 Aug 2018 14:01)  HR: 52 (28 Aug 2018 10:15) (52 - 86)  BP: 132/67 (28 Aug 2018 10:15) (130/64 - 167/64)  RR: 18 (28 Aug 2018 10:15) (18 - 20)  SpO2: 95% (28 Aug 2018 10:15) (95% - 98%)    PHYSICAL EXAM:    GENERAL: NAD  CHEST/LUNG: positive air entry   HEART: s1/s2 audible  ABDOMEN: Soft, Nontender, Nondistended; Bowel sounds present  EXTREMITIES:  no edema

## 2018-08-28 NOTE — DISCHARGE NOTE ADULT - PLAN OF CARE
optimal cardiac function No heavy lifting, driving, sex, tub baths, swimming, or any activity that submerges the lower half of the body in water for 48 hours.  Limited walking and stairs for 48 hours.    Change the bandaid after 24 hours and every 24 hours after that.  Keep the puncture site dry and covered with a bandaid until a scab forms.    Observe the site frequently.  If bleeding or a large lump (the size of a golf ball or bigger) occurs lie flat, apply continuous direct pressure just above the puncture site for at least 10 minutes, and notify your physician immediately.  If the bleeding cannot be controlled, call 911 immediately for assistance.  Notify your physician of pain, swelling or any drainage.    Notify your physician immediately if coldness, numbness, discoloration or pain in your foot occurs. c/w home medication c/w statin

## 2018-08-29 ENCOUNTER — APPOINTMENT (OUTPATIENT)
Dept: CARDIOLOGY | Facility: CLINIC | Age: 68
End: 2018-08-29
Payer: MEDICARE

## 2018-08-29 PROCEDURE — 93880 EXTRACRANIAL BILAT STUDY: CPT

## 2018-08-30 ENCOUNTER — RESULT REVIEW (OUTPATIENT)
Age: 68
End: 2018-08-30

## 2018-09-07 ENCOUNTER — APPOINTMENT (OUTPATIENT)
Dept: CARDIOLOGY | Facility: CLINIC | Age: 68
End: 2018-09-07
Payer: MEDICARE

## 2018-09-07 ENCOUNTER — APPOINTMENT (OUTPATIENT)
Dept: CHRONIC DISEASE MANAGEMENT | Facility: CLINIC | Age: 68
End: 2018-09-07

## 2018-09-07 ENCOUNTER — NON-APPOINTMENT (OUTPATIENT)
Age: 68
End: 2018-09-07

## 2018-09-07 VITALS — DIASTOLIC BLOOD PRESSURE: 88 MMHG | SYSTOLIC BLOOD PRESSURE: 150 MMHG

## 2018-09-07 VITALS
WEIGHT: 230 LBS | DIASTOLIC BLOOD PRESSURE: 90 MMHG | HEART RATE: 61 BPM | BODY MASS INDEX: 31.15 KG/M2 | OXYGEN SATURATION: 95 % | SYSTOLIC BLOOD PRESSURE: 150 MMHG | HEIGHT: 72 IN

## 2018-09-07 VITALS — DIASTOLIC BLOOD PRESSURE: 86 MMHG | SYSTOLIC BLOOD PRESSURE: 148 MMHG

## 2018-09-07 DIAGNOSIS — Z77.090 CONTACT WITH AND (SUSPECTED) EXPOSURE TO ASBESTOS: ICD-10-CM

## 2018-09-07 PROCEDURE — 93000 ELECTROCARDIOGRAM COMPLETE: CPT

## 2018-09-07 PROCEDURE — 99215 OFFICE O/P EST HI 40 MIN: CPT

## 2018-09-24 ENCOUNTER — NON-APPOINTMENT (OUTPATIENT)
Age: 68
End: 2018-09-24

## 2018-09-24 ENCOUNTER — LABORATORY RESULT (OUTPATIENT)
Age: 68
End: 2018-09-24

## 2018-09-24 ENCOUNTER — APPOINTMENT (OUTPATIENT)
Dept: PULMONOLOGY | Facility: CLINIC | Age: 68
End: 2018-09-24
Payer: MEDICARE

## 2018-09-24 VITALS
HEIGHT: 72 IN | HEART RATE: 75 BPM | BODY MASS INDEX: 31.15 KG/M2 | RESPIRATION RATE: 18 BRPM | TEMPERATURE: 98.2 F | OXYGEN SATURATION: 96 % | WEIGHT: 230 LBS | DIASTOLIC BLOOD PRESSURE: 82 MMHG | SYSTOLIC BLOOD PRESSURE: 128 MMHG

## 2018-09-24 PROCEDURE — 99204 OFFICE O/P NEW MOD 45 MIN: CPT | Mod: 25

## 2018-09-24 PROCEDURE — 36415 COLL VENOUS BLD VENIPUNCTURE: CPT

## 2018-09-24 PROCEDURE — 94010 BREATHING CAPACITY TEST: CPT

## 2018-09-25 LAB
T3 SERPL-MCNC: 119 NG/DL
T3RU NFR SERPL: 1.05 INDEX
T4 FREE SERPL-MCNC: 1.4 NG/DL
T4 SERPL-MCNC: 8 UG/DL
TSH SERPL-ACNC: 1.92 UIU/ML

## 2018-09-26 ENCOUNTER — APPOINTMENT (OUTPATIENT)
Dept: PULMONOLOGY | Facility: CLINIC | Age: 68
End: 2018-09-26
Payer: MEDICARE

## 2018-09-26 VITALS — BODY MASS INDEX: 29.7 KG/M2 | WEIGHT: 219 LBS

## 2018-09-26 PROCEDURE — 94060 EVALUATION OF WHEEZING: CPT

## 2018-09-26 PROCEDURE — 85018 HEMOGLOBIN: CPT | Mod: QW

## 2018-09-26 PROCEDURE — 94729 DIFFUSING CAPACITY: CPT

## 2018-09-26 PROCEDURE — 94727 GAS DIL/WSHOT DETER LNG VOL: CPT

## 2018-09-26 PROCEDURE — 94664 DEMO&/EVAL PT USE INHALER: CPT | Mod: 59

## 2018-10-05 ENCOUNTER — NON-APPOINTMENT (OUTPATIENT)
Age: 68
End: 2018-10-05

## 2018-10-05 ENCOUNTER — APPOINTMENT (OUTPATIENT)
Dept: CARDIOLOGY | Facility: CLINIC | Age: 68
End: 2018-10-05
Payer: MEDICARE

## 2018-10-05 VITALS — DIASTOLIC BLOOD PRESSURE: 84 MMHG | SYSTOLIC BLOOD PRESSURE: 138 MMHG

## 2018-10-05 VITALS
DIASTOLIC BLOOD PRESSURE: 94 MMHG | HEIGHT: 72 IN | SYSTOLIC BLOOD PRESSURE: 144 MMHG | WEIGHT: 222 LBS | OXYGEN SATURATION: 98 % | BODY MASS INDEX: 30.07 KG/M2 | HEART RATE: 60 BPM

## 2018-10-05 VITALS — SYSTOLIC BLOOD PRESSURE: 150 MMHG | DIASTOLIC BLOOD PRESSURE: 86 MMHG

## 2018-10-05 PROCEDURE — 93000 ELECTROCARDIOGRAM COMPLETE: CPT

## 2018-10-05 PROCEDURE — 99214 OFFICE O/P EST MOD 30 MIN: CPT

## 2018-10-07 ENCOUNTER — RX RENEWAL (OUTPATIENT)
Age: 68
End: 2018-10-07

## 2018-10-10 ENCOUNTER — APPOINTMENT (OUTPATIENT)
Dept: CARDIOLOGY | Facility: CLINIC | Age: 68
End: 2018-10-10
Payer: MEDICARE

## 2018-10-10 PROCEDURE — 93306 TTE W/DOPPLER COMPLETE: CPT

## 2018-10-25 PROCEDURE — 80048 BASIC METABOLIC PNL TOTAL CA: CPT

## 2018-10-25 PROCEDURE — 76937 US GUIDE VASCULAR ACCESS: CPT

## 2018-10-25 PROCEDURE — 99285 EMERGENCY DEPT VISIT HI MDM: CPT

## 2018-10-25 PROCEDURE — 83036 HEMOGLOBIN GLYCOSYLATED A1C: CPT

## 2018-10-25 PROCEDURE — C1887: CPT

## 2018-10-25 PROCEDURE — 84436 ASSAY OF TOTAL THYROXINE: CPT

## 2018-10-25 PROCEDURE — 93459 L HRT ART/GRFT ANGIO: CPT

## 2018-10-25 PROCEDURE — 84443 ASSAY THYROID STIM HORMONE: CPT

## 2018-10-25 PROCEDURE — 36415 COLL VENOUS BLD VENIPUNCTURE: CPT

## 2018-10-25 PROCEDURE — 99152 MOD SED SAME PHYS/QHP 5/>YRS: CPT

## 2018-10-25 PROCEDURE — 71046 X-RAY EXAM CHEST 2 VIEWS: CPT

## 2018-10-25 PROCEDURE — 85610 PROTHROMBIN TIME: CPT

## 2018-10-25 PROCEDURE — C1894: CPT

## 2018-10-25 PROCEDURE — 80061 LIPID PANEL: CPT

## 2018-10-25 PROCEDURE — 84484 ASSAY OF TROPONIN QUANT: CPT

## 2018-10-25 PROCEDURE — 85027 COMPLETE CBC AUTOMATED: CPT

## 2018-10-25 PROCEDURE — 84100 ASSAY OF PHOSPHORUS: CPT

## 2018-10-25 PROCEDURE — 85730 THROMBOPLASTIN TIME PARTIAL: CPT

## 2018-10-25 PROCEDURE — C1769: CPT

## 2018-10-25 PROCEDURE — 83735 ASSAY OF MAGNESIUM: CPT

## 2018-10-25 PROCEDURE — 80053 COMPREHEN METABOLIC PANEL: CPT

## 2018-10-26 ENCOUNTER — RECORD ABSTRACTING (OUTPATIENT)
Age: 68
End: 2018-10-26

## 2018-10-29 ENCOUNTER — RESULT REVIEW (OUTPATIENT)
Age: 68
End: 2018-10-29

## 2018-10-30 ENCOUNTER — APPOINTMENT (OUTPATIENT)
Dept: ENDOCRINOLOGY | Facility: CLINIC | Age: 68
End: 2018-10-30
Payer: MEDICARE

## 2018-10-30 VITALS
WEIGHT: 215 LBS | HEIGHT: 72 IN | BODY MASS INDEX: 29.12 KG/M2 | SYSTOLIC BLOOD PRESSURE: 120 MMHG | HEART RATE: 58 BPM | DIASTOLIC BLOOD PRESSURE: 70 MMHG

## 2018-10-30 PROCEDURE — 99214 OFFICE O/P EST MOD 30 MIN: CPT

## 2018-10-30 RX ORDER — METOPROLOL SUCCINATE 25 MG/1
25 TABLET, EXTENDED RELEASE ORAL DAILY
Qty: 180 | Refills: 3 | Status: DISCONTINUED | COMMUNITY
Start: 2017-08-02 | End: 2018-10-30

## 2018-11-02 ENCOUNTER — RESULT REVIEW (OUTPATIENT)
Age: 68
End: 2018-11-02

## 2018-11-02 ENCOUNTER — APPOINTMENT (OUTPATIENT)
Dept: PULMONOLOGY | Facility: CLINIC | Age: 68
End: 2018-11-02
Payer: MEDICARE

## 2018-11-02 VITALS
TEMPERATURE: 97.8 F | DIASTOLIC BLOOD PRESSURE: 76 MMHG | SYSTOLIC BLOOD PRESSURE: 124 MMHG | OXYGEN SATURATION: 97 % | RESPIRATION RATE: 17 BRPM | HEART RATE: 74 BPM

## 2018-11-02 DIAGNOSIS — J45.909 UNSPECIFIED ASTHMA, UNCOMPLICATED: ICD-10-CM

## 2018-11-02 LAB
ALBUMIN SERPL ELPH-MCNC: 4.7 G/DL
ALP BLD-CCNC: 97 U/L
ALT SERPL-CCNC: 19 U/L
ANION GAP SERPL CALC-SCNC: 13 MMOL/L
AST SERPL-CCNC: 19 U/L
BILIRUB SERPL-MCNC: 0.4 MG/DL
BUN SERPL-MCNC: 19 MG/DL
CALCIUM SERPL-MCNC: 9.7 MG/DL
CHLORIDE SERPL-SCNC: 101 MMOL/L
CO2 SERPL-SCNC: 28 MMOL/L
CREAT SERPL-MCNC: 0.98 MG/DL
GLUCOSE SERPL-MCNC: 133 MG/DL
POTASSIUM SERPL-SCNC: 4.8 MMOL/L
PROT SERPL-MCNC: 6.9 G/DL
SODIUM SERPL-SCNC: 142 MMOL/L

## 2018-11-02 PROCEDURE — 99214 OFFICE O/P EST MOD 30 MIN: CPT

## 2018-12-14 ENCOUNTER — RX RENEWAL (OUTPATIENT)
Age: 68
End: 2018-12-14

## 2019-02-05 ENCOUNTER — MEDICATION RENEWAL (OUTPATIENT)
Age: 69
End: 2019-02-05

## 2019-03-19 NOTE — PATIENT PROFILE ADULT. - PRO MENTAL HEALTH SX RECENT
Detail Level: Zone Plan: Patient will start topical cream this year after knee surgery.   Discussed with patient to follow up about 1 month after topical treatment Initiate Treatment: Efudex 5 % topical cream to face, tops of hands, forearms, BID for 2 weeks on, 2 weeks off, then 2 weeks on\\nhydrocortisone 2.5 % topical ointment to affected areas on face, hands, arms and upper back BID for irritation from Efudex. apply 30 min after efudex as directed Render In Strict Bullet Format?: No none

## 2019-03-25 ENCOUNTER — APPOINTMENT (OUTPATIENT)
Dept: PULMONOLOGY | Facility: CLINIC | Age: 69
End: 2019-03-25

## 2019-04-01 ENCOUNTER — MEDICATION RENEWAL (OUTPATIENT)
Age: 69
End: 2019-04-01

## 2019-04-09 ENCOUNTER — APPOINTMENT (OUTPATIENT)
Dept: ENDOCRINOLOGY | Facility: CLINIC | Age: 69
End: 2019-04-09
Payer: MEDICARE

## 2019-04-09 VITALS
BODY MASS INDEX: 31.02 KG/M2 | HEART RATE: 82 BPM | SYSTOLIC BLOOD PRESSURE: 130 MMHG | DIASTOLIC BLOOD PRESSURE: 80 MMHG | WEIGHT: 229 LBS | HEIGHT: 72 IN

## 2019-04-09 PROCEDURE — 99213 OFFICE O/P EST LOW 20 MIN: CPT

## 2019-04-09 NOTE — HISTORY OF PRESENT ILLNESS
[FreeTextEntry1] : Pt here for follow up Graves disease Thyroid nodule PreDM -\par \par seeing urology  PSA increased again \par given antibiotic \par  for follow up in June\par \par  started exercsing after broke ankle in jan hunting

## 2019-04-09 NOTE — PHYSICAL EXAM
[Alert] : alert [No Acute Distress] : no acute distress [Well Developed] : well developed [Well Nourished] : well nourished [Normal Sclera/Conjunctiva] : normal sclera/conjunctiva [EOMI] : extra ocular movement intact [No Proptosis] : no proptosis [No Respiratory Distress] : no respiratory distress [Clear to Auscultation] : lungs were clear to auscultation bilaterally [Normal Rate] : heart rate was normal  [No Accessory Muscle Use] : no accessory muscle use [Normal S1, S2] : normal S1 and S2 [Regular Rhythm] : with a regular rhythm [No Edema] : there was no peripheral edema [Post Cervical Nodes] : posterior cervical nodes [Anterior Cervical Nodes] : anterior cervical nodes [Normal] : normal and non tender [Normal Gait] : normal gait [Normal Strength/Tone] : muscle strength and tone were normal [No Rash] : no rash [No Tremors] : no tremors [Normal Reflexes] : deep tendon reflexes were 2+ and symmetric [Oriented x3] : oriented to person, place, and time [Acanthosis Nigricans] : no acanthosis nigricans [de-identified] : mildly enalrged thyroid Right thryoid nodule  Neg alton

## 2019-04-09 NOTE — ASSESSMENT
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

## 2019-04-12 ENCOUNTER — NON-APPOINTMENT (OUTPATIENT)
Age: 69
End: 2019-04-12

## 2019-04-12 ENCOUNTER — APPOINTMENT (OUTPATIENT)
Dept: CARDIOLOGY | Facility: CLINIC | Age: 69
End: 2019-04-12
Payer: MEDICARE

## 2019-04-12 VITALS
WEIGHT: 229 LBS | DIASTOLIC BLOOD PRESSURE: 70 MMHG | OXYGEN SATURATION: 97 % | HEIGHT: 72 IN | BODY MASS INDEX: 31.02 KG/M2 | HEART RATE: 76 BPM | RESPIRATION RATE: 16 BRPM | SYSTOLIC BLOOD PRESSURE: 118 MMHG

## 2019-04-12 DIAGNOSIS — N52.2 DRUG-INDUCED ERECTILE DYSFUNCTION: ICD-10-CM

## 2019-04-12 PROCEDURE — 99214 OFFICE O/P EST MOD 30 MIN: CPT

## 2019-04-12 PROCEDURE — 93000 ELECTROCARDIOGRAM COMPLETE: CPT

## 2019-04-12 RX ORDER — IBUPROFEN 800 MG/1
800 TABLET, FILM COATED ORAL
Qty: 270 | Refills: 0 | Status: DISCONTINUED | COMMUNITY
Start: 2017-10-23 | End: 2019-04-12

## 2019-04-12 RX ORDER — FINASTERIDE 5 MG/1
5 TABLET, FILM COATED ORAL
Qty: 90 | Refills: 0 | Status: DISCONTINUED | COMMUNITY
Start: 2018-02-08 | End: 2019-04-12

## 2019-06-06 ENCOUNTER — RX RENEWAL (OUTPATIENT)
Age: 69
End: 2019-06-06

## 2019-06-21 LAB
CHOLEST SERPL-MCNC: 153 MG/DL
CHOLEST/HDLC SERPL: 2.7 RATIO
HBA1C MFR BLD HPLC: 6 %
HDLC SERPL-MCNC: 56 MG/DL
LDLC SERPL CALC-MCNC: 84 MG/DL
TRIGL SERPL-MCNC: 64 MG/DL

## 2019-09-02 NOTE — HISTORY OF PRESENT ILLNESS
[FreeTextEntry1] :  67 year old man with history of prediabetes, coronary disease S/P CABG in [place] who noted atypical chest pain and had an abnormal stress test back in 2012.  Cardiac cath in March 2014 after recurrent chest pain with patent LIMA to LAD, patent SVG to D2 and RPDA but closed graft to LCX and 90% native LCx lesion.  He underwent MARCELINA stent to native LCx and is doing well.  He denies chest pain and dyspnea.  Additionally, he was found to be hyperthyroid and is now on methimazole for treatment.  He tolerated cholecystectomy and hernia repair and had returned to the gym.  He has decided to continue with methimazole for the hyperthyroidism.  He could no longer afford the Repatha so is now on Crestor and tolerating it with recent LDL 84 mg/dL. In early August, he noted three weeks of exertional dyspnea.  He denied chest pain and felt it was related to the humidity but went to the ER at [facility].  Cardiac catheterization was performed with patent grafts and EF 40% with inferior HK which is unchanged from prior echos but a gradual decline from 2014.  He now feels well and is back to the gym, exercising without symptoms.  His last TSH was at goal by report but he is to see endocrine this month.   He has been diagnosed with prediabetes by the endocrinologist.  Carotid Dopplers with mild to moderate athersclerosis bilaterally.  He broke his ankle in December and is now back to the gym with no significant chest pain or dyspnea.  He continues to use a bone stimulator.  He is complaining of erectile dysfunction and tried Viagra which made him feel presyncopal.  He is tolerating Crestor with no myalgias.  Lipid levels at [facility].  He was recently treated with antibiotics for prostatiitis. . \par \par \par

## 2019-09-02 NOTE — REVIEW OF SYSTEMS
[Recent Weight Loss (___ Lbs)] : recent [unfilled] ~Ulb weight loss [see HPI] : see HPI [Negative] : Endocrine [Dyspnea on exertion] : not dyspnea during exertion

## 2019-09-02 NOTE — PHYSICAL EXAM
[General Appearance - Well Developed] : well developed [Well Groomed] : well groomed [General Appearance - Well Nourished] : well nourished [Normal Appearance] : normal appearance [Normal Conjunctiva] : the conjunctiva exhibited no abnormalities [General Appearance - In No Acute Distress] : no acute distress [No Oral Cyanosis] : no oral cyanosis [No Oral Pallor] : no oral pallor [No Jugular Venous Bueno A Waves] : no jugular venous bueno A waves [Normal Jugular Venous V Waves Present] : normal jugular venous V waves present [Respiration, Rhythm And Depth] : normal respiratory rhythm and effort [Exaggerated Use Of Accessory Muscles For Inspiration] : no accessory muscle use [Heart Sounds] : normal S1 and S2 [Heart Rate And Rhythm] : heart rate and rhythm were normal [Auscultation Breath Sounds / Voice Sounds] : lungs were clear to auscultation bilaterally [Murmurs] : no murmurs present [Edema] : no peripheral edema present [Abdomen Soft] : soft [Abdomen Tenderness] : non-tender [Abnormal Walk] : normal gait [Nail Clubbing] : no clubbing of the fingernails [Skin Turgor] : normal skin turgor [Cyanosis, Localized] : no localized cyanosis [] : no rash [Oriented To Time, Place, And Person] : oriented to person, place, and time [Impaired Insight] : insight and judgment were intact [Affect] : the affect was normal [Memory Recent] : recent memory was not impaired [FreeTextEntry1] : PT pulses bilaterally 1+

## 2019-09-16 ENCOUNTER — RX RENEWAL (OUTPATIENT)
Age: 69
End: 2019-09-16

## 2019-09-17 ENCOUNTER — RX RENEWAL (OUTPATIENT)
Age: 69
End: 2019-09-17

## 2019-10-10 ENCOUNTER — APPOINTMENT (OUTPATIENT)
Dept: ENDOCRINOLOGY | Facility: CLINIC | Age: 69
End: 2019-10-10
Payer: MEDICARE

## 2019-10-10 VITALS
OXYGEN SATURATION: 97 % | HEART RATE: 77 BPM | SYSTOLIC BLOOD PRESSURE: 132 MMHG | WEIGHT: 231 LBS | HEIGHT: 72 IN | DIASTOLIC BLOOD PRESSURE: 80 MMHG | BODY MASS INDEX: 31.29 KG/M2

## 2019-10-10 DIAGNOSIS — L73.8 OTHER SPECIFIED FOLLICULAR DISORDERS: ICD-10-CM

## 2019-10-10 PROCEDURE — 99214 OFFICE O/P EST MOD 30 MIN: CPT

## 2019-10-13 PROBLEM — L73.8 FOLLICULITIS BARBAE: Status: ACTIVE | Noted: 2019-10-10

## 2019-10-13 NOTE — HISTORY OF PRESENT ILLNESS
[FreeTextEntry1] : Pt here for follow up Graves disease Thyroid nodule PreDM -\par \par seeing urology  PSA increased again \par but now resolved after prostatits  treated  back to normal \par  broke ankle in Jan - had to  have surgery - about 4 weeks ago

## 2019-10-13 NOTE — ASSESSMENT
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

## 2019-10-13 NOTE — PHYSICAL EXAM
[Alert] : alert [No Acute Distress] : no acute distress [Well Nourished] : well nourished [Well Developed] : well developed [Normal Sclera/Conjunctiva] : normal sclera/conjunctiva [EOMI] : extra ocular movement intact [No Proptosis] : no proptosis [No Respiratory Distress] : no respiratory distress [No Accessory Muscle Use] : no accessory muscle use [Clear to Auscultation] : lungs were clear to auscultation bilaterally [Normal Rate] : heart rate was normal  [Normal S1, S2] : normal S1 and S2 [Regular Rhythm] : with a regular rhythm [No Edema] : there was no peripheral edema [Post Cervical Nodes] : posterior cervical nodes [Anterior Cervical Nodes] : anterior cervical nodes [Normal] : normal and non tender [Normal Gait] : normal gait [Normal Strength/Tone] : muscle strength and tone were normal [Normal Reflexes] : deep tendon reflexes were 2+ and symmetric [No Tremors] : no tremors [Oriented x3] : oriented to person, place, and time [Acanthosis Nigricans] : no acanthosis nigricans [de-identified] : mildly enalrged thyroid Right thryoid nodule  Neg alton  [de-identified] : irritation/redness side burn area bilaterally

## 2019-10-17 ENCOUNTER — APPOINTMENT (OUTPATIENT)
Dept: ENDOCRINOLOGY | Facility: CLINIC | Age: 69
End: 2019-10-17

## 2019-10-23 ENCOUNTER — MEDICATION RENEWAL (OUTPATIENT)
Age: 69
End: 2019-10-23

## 2019-11-08 ENCOUNTER — APPOINTMENT (OUTPATIENT)
Dept: CARDIOLOGY | Facility: CLINIC | Age: 69
End: 2019-11-08
Payer: MEDICARE

## 2019-11-08 ENCOUNTER — NON-APPOINTMENT (OUTPATIENT)
Age: 69
End: 2019-11-08

## 2019-11-08 VITALS
OXYGEN SATURATION: 98 % | DIASTOLIC BLOOD PRESSURE: 62 MMHG | RESPIRATION RATE: 16 BRPM | HEIGHT: 72 IN | WEIGHT: 230 LBS | HEART RATE: 62 BPM | SYSTOLIC BLOOD PRESSURE: 116 MMHG | BODY MASS INDEX: 31.15 KG/M2

## 2019-11-08 DIAGNOSIS — R06.02 SHORTNESS OF BREATH: ICD-10-CM

## 2019-11-08 DIAGNOSIS — R53.83 OTHER FATIGUE: ICD-10-CM

## 2019-11-08 DIAGNOSIS — I49.9 CARDIAC ARRHYTHMIA, UNSPECIFIED: ICD-10-CM

## 2019-11-08 PROCEDURE — 99215 OFFICE O/P EST HI 40 MIN: CPT

## 2019-11-08 PROCEDURE — 93000 ELECTROCARDIOGRAM COMPLETE: CPT

## 2019-11-11 ENCOUNTER — APPOINTMENT (OUTPATIENT)
Dept: CARDIOLOGY | Facility: CLINIC | Age: 69
End: 2019-11-11
Payer: MEDICARE

## 2019-11-11 PROCEDURE — 93306 TTE W/DOPPLER COMPLETE: CPT

## 2019-11-12 ENCOUNTER — APPOINTMENT (OUTPATIENT)
Dept: CARDIOLOGY | Facility: CLINIC | Age: 69
End: 2019-11-12
Payer: MEDICARE

## 2019-11-12 PROCEDURE — A9500: CPT

## 2019-11-12 PROCEDURE — 78452 HT MUSCLE IMAGE SPECT MULT: CPT

## 2019-11-12 PROCEDURE — 93015 CV STRESS TEST SUPVJ I&R: CPT

## 2019-12-03 ENCOUNTER — RX RENEWAL (OUTPATIENT)
Age: 69
End: 2019-12-03

## 2019-12-06 ENCOUNTER — OUTPATIENT (OUTPATIENT)
Dept: OUTPATIENT SERVICES | Facility: HOSPITAL | Age: 69
LOS: 1 days | End: 2019-12-06
Payer: MEDICARE

## 2019-12-06 VITALS
SYSTOLIC BLOOD PRESSURE: 138 MMHG | WEIGHT: 233.91 LBS | HEART RATE: 70 BPM | RESPIRATION RATE: 18 BRPM | TEMPERATURE: 98 F | HEIGHT: 72 IN | OXYGEN SATURATION: 97 % | DIASTOLIC BLOOD PRESSURE: 66 MMHG

## 2019-12-06 DIAGNOSIS — Z90.49 ACQUIRED ABSENCE OF OTHER SPECIFIED PARTS OF DIGESTIVE TRACT: Chronic | ICD-10-CM

## 2019-12-06 DIAGNOSIS — Z01.810 ENCOUNTER FOR PREPROCEDURAL CARDIOVASCULAR EXAMINATION: ICD-10-CM

## 2019-12-06 DIAGNOSIS — Z95.5 PRESENCE OF CORONARY ANGIOPLASTY IMPLANT AND GRAFT: Chronic | ICD-10-CM

## 2019-12-06 DIAGNOSIS — Z95.1 PRESENCE OF AORTOCORONARY BYPASS GRAFT: Chronic | ICD-10-CM

## 2019-12-06 DIAGNOSIS — Z98.89 OTHER SPECIFIED POSTPROCEDURAL STATES: Chronic | ICD-10-CM

## 2019-12-06 LAB
ANION GAP SERPL CALC-SCNC: 12 MMOL/L — SIGNIFICANT CHANGE UP (ref 5–17)
APTT BLD: 34.6 SEC — SIGNIFICANT CHANGE UP (ref 27.5–36.3)
BASOPHILS # BLD AUTO: 0.03 K/UL — SIGNIFICANT CHANGE UP (ref 0–0.2)
BASOPHILS NFR BLD AUTO: 0.4 % — SIGNIFICANT CHANGE UP (ref 0–2)
BUN SERPL-MCNC: 18 MG/DL — SIGNIFICANT CHANGE UP (ref 8–20)
CALCIUM SERPL-MCNC: 9.1 MG/DL — SIGNIFICANT CHANGE UP (ref 8.6–10.2)
CHLORIDE SERPL-SCNC: 103 MMOL/L — SIGNIFICANT CHANGE UP (ref 98–107)
CO2 SERPL-SCNC: 25 MMOL/L — SIGNIFICANT CHANGE UP (ref 22–29)
CREAT SERPL-MCNC: 0.9 MG/DL — SIGNIFICANT CHANGE UP (ref 0.5–1.3)
EOSINOPHIL # BLD AUTO: 0.17 K/UL — SIGNIFICANT CHANGE UP (ref 0–0.5)
EOSINOPHIL NFR BLD AUTO: 2.5 % — SIGNIFICANT CHANGE UP (ref 0–6)
GLUCOSE SERPL-MCNC: 116 MG/DL — HIGH (ref 70–115)
HCT VFR BLD CALC: 41.7 % — SIGNIFICANT CHANGE UP (ref 39–50)
HGB BLD-MCNC: 13.4 G/DL — SIGNIFICANT CHANGE UP (ref 13–17)
IMM GRANULOCYTES NFR BLD AUTO: 0.3 % — SIGNIFICANT CHANGE UP (ref 0–1.5)
INR BLD: 1.03 RATIO — SIGNIFICANT CHANGE UP (ref 0.88–1.16)
LYMPHOCYTES # BLD AUTO: 1.07 K/UL — SIGNIFICANT CHANGE UP (ref 1–3.3)
LYMPHOCYTES # BLD AUTO: 15.9 % — SIGNIFICANT CHANGE UP (ref 13–44)
MAGNESIUM SERPL-MCNC: 1.9 MG/DL — SIGNIFICANT CHANGE UP (ref 1.6–2.6)
MCHC RBC-ENTMCNC: 27.6 PG — SIGNIFICANT CHANGE UP (ref 27–34)
MCHC RBC-ENTMCNC: 32.1 GM/DL — SIGNIFICANT CHANGE UP (ref 32–36)
MCV RBC AUTO: 85.8 FL — SIGNIFICANT CHANGE UP (ref 80–100)
MONOCYTES # BLD AUTO: 0.46 K/UL — SIGNIFICANT CHANGE UP (ref 0–0.9)
MONOCYTES NFR BLD AUTO: 6.8 % — SIGNIFICANT CHANGE UP (ref 2–14)
NEUTROPHILS # BLD AUTO: 4.98 K/UL — SIGNIFICANT CHANGE UP (ref 1.8–7.4)
NEUTROPHILS NFR BLD AUTO: 74.1 % — SIGNIFICANT CHANGE UP (ref 43–77)
PLATELET # BLD AUTO: 279 K/UL — SIGNIFICANT CHANGE UP (ref 150–400)
POTASSIUM SERPL-MCNC: 4 MMOL/L — SIGNIFICANT CHANGE UP (ref 3.5–5.3)
POTASSIUM SERPL-SCNC: 4 MMOL/L — SIGNIFICANT CHANGE UP (ref 3.5–5.3)
PROTHROM AB SERPL-ACNC: 11.8 SEC — SIGNIFICANT CHANGE UP (ref 10–12.9)
RBC # BLD: 4.86 M/UL — SIGNIFICANT CHANGE UP (ref 4.2–5.8)
RBC # FLD: 13.5 % — SIGNIFICANT CHANGE UP (ref 10.3–14.5)
SODIUM SERPL-SCNC: 140 MMOL/L — SIGNIFICANT CHANGE UP (ref 135–145)
WBC # BLD: 6.73 K/UL — SIGNIFICANT CHANGE UP (ref 3.8–10.5)
WBC # FLD AUTO: 6.73 K/UL — SIGNIFICANT CHANGE UP (ref 3.8–10.5)

## 2019-12-06 PROCEDURE — 83735 ASSAY OF MAGNESIUM: CPT

## 2019-12-06 PROCEDURE — 85027 COMPLETE CBC AUTOMATED: CPT

## 2019-12-06 PROCEDURE — 93005 ELECTROCARDIOGRAM TRACING: CPT

## 2019-12-06 PROCEDURE — 80048 BASIC METABOLIC PNL TOTAL CA: CPT

## 2019-12-06 PROCEDURE — 36415 COLL VENOUS BLD VENIPUNCTURE: CPT

## 2019-12-06 PROCEDURE — G0463: CPT

## 2019-12-06 PROCEDURE — 85610 PROTHROMBIN TIME: CPT

## 2019-12-06 PROCEDURE — 85730 THROMBOPLASTIN TIME PARTIAL: CPT

## 2019-12-06 PROCEDURE — 93010 ELECTROCARDIOGRAM REPORT: CPT

## 2019-12-06 NOTE — H&P PST ADULT - ASSESSMENT
This a 67 year old male PMH:Pt with atypical chest pain and abnormal stress in 2012  s/p CABG in [place] 2012. Recurrent chest pain and Angina   3/2014 with cardiac cath : Patent LIMA - LAD, patent SVG - D2 , RPDA, SVG - LCX closed . Native LCX 90 % lesion and received a MARCELINA stent native LCX .  3/7/16 Cardiac cath : Patent LIMA- LAD Patent SVG - Diagonal , patent SVG - RCA: Occluded SVG - LCX; OM1 - 70 % stenosis -MARCELINA resolute 2.5x12  , New LM 70 % disease s/p MARCELINA Resolute 4.0x15 , EDP 25   8/28/19 Cardiac cath : Patent LIMA-LAD , Patebt SVG - D1 , Patent SVG - RCA, Patent Stents - OM1 / LCX EF 40 %   He presented again to cardiologist 11/8/19 with recurrent episodes of dyspnea  with heavy lifting and with exercise.   A NST was done revealing moderate to severe defect in basal inferior and inferior-lateral walls that are fixed suggestive if infarction with minimal thelma-infarct ischemia. Post stress EF 46%   EKG with ST depressions in II, III, V4- V6 and frequent PVCs   A echo11/19  also revealed Moderate AS and Mild moderate Mitral regurgitation   He was recommended to have a cardiac cath for evaluation of his coronaries   He is currently feels well   PRE-PROCEDURE ASSESSMENT  LHC   -Patient seen and examined  -Labs reviewed  -Pre-procedure teaching completed with patient   -Questions answered  _ Pt instructions given   _ NPO except for medications with a sip of water   _ pt instructed to conintue all medications including Asa and Plavix   _ Pt instructed to seek medical attention if a change in condition

## 2019-12-06 NOTE — H&P PST ADULT - ATTENDING COMMENTS
68 y/o male with exertional angina FC II, abnormal stress test   CABG with patent LIMA to LAD, SVG to D1, SVG to RCA   indicated for LHC

## 2019-12-06 NOTE — H&P PST ADULT - HISTORY OF PRESENT ILLNESS
This a 67 year old male PMH: prediabetes s/p CABG in [place]     Mallampati   ASA   GFR   BRA   Symptoms:        Angina (Class): Class 2- 3 SOB and fatigue Angina equivalent        Ischemic Symptoms:     Heart Failure: none        Systolic/Diastolic/Combined:        NYHA Class (within 2 weeks):     Assessment of LVEF (Must be within 6 months):       EF:        Assessed by:        Date:     Prior Cardiac Interventions (LHC, stents, CABG):  s/p CABG in [place]   s/p Cardiac cath 2018 patent grafts and stents   s/p Cardiac cath 2018          PCI's (Date, Stents, Vessels):        CABG (Date, Grafts):     Noninvasive Testing:   Stress Test: Date:        Protocol:        Duration of Exercise:        Symptoms:        EKG Changes:        DTS:        Myocardial Imaging:        Risk Assessment (Low, Medium, High):     Echo (Date, Findings):     Antianginal Therapies:        Beta Blockers:         Calcium Channel Blockers:        Long Acting Nitrates:        Ranexa:     Associated Risk Factors:        Cerebrovascular Disease: N/A       Chronic Lung Disease: N/A       Peripheral Arterial Disease: N/A       Chronic Kidney Disease (if yes, what is GFR): N/A       Uncontrolled Diabetes (if yes, what is HgbA1C or FBS): N/A       Poorly Controlled Hypertension (if yes, what is SBP): N/A       Morbid Obesity (if yes, what is BMI): N/A       History of Recent Ventricular Arrhythmia: N/A       Inability to Ambulate Safely: N/A       Need for Therapeutic Anticoagulation: N/A       Antiplatelet or Contrast Allergy: N/A This a 67 year old male PMH:Pt with atypical chest pain and abnormal stress in 2012  s/p CABG in [place] 2012. Recurrent chest pain and Angina 3/2014 with cardiac cath : Patent LIMA - LAD, patent SVG - D2 , RPDA, SVG - LCX closed . Native LCX 90 % lesion and received a MARCELINA stent native LCX . Pt with recurrent symptoms of exertional dyspnea in 8/18 presented to  and taken to Cardiac cath lab. Cath  revealed patent grafts EF 40%     Mallampati   ASA   GFR   BRA   Symptoms:        Angina (Class): Class 2- 3 SOB and fatigue Angina equivalent        Ischemic Symptoms:     Heart Failure: none        Systolic/Diastolic/Combined:        NYHA Class (within 2 weeks):     Assessment of LVEF (Must be within 6 months):       EF:        Assessed by:        Date:     Prior Cardiac Interventions (LHC, stents, CABG):  s/p CABG in [place]   s/p Cardiac cath 2018 patent grafts and stents   s/p Cardiac cath 2018          PCI's (Date, Stents, Vessels):        CABG (Date, Grafts):     Noninvasive Testing:   Stress Test: Date:        Protocol:        Duration of Exercise:        Symptoms:        EKG Changes:        DTS:        Myocardial Imaging:        Risk Assessment (Low, Medium, High):     Echo (Date, Findings):     Antianginal Therapies:        Beta Blockers:         Calcium Channel Blockers:        Long Acting Nitrates:        Ranexa:     Associated Risk Factors:        Cerebrovascular Disease: N/A       Chronic Lung Disease: N/A       Peripheral Arterial Disease: N/A       Chronic Kidney Disease (if yes, what is GFR): N/A       Uncontrolled Diabetes (if yes, what is HgbA1C or FBS): N/A       Poorly Controlled Hypertension (if yes, what is SBP): N/A       Morbid Obesity (if yes, what is BMI): N/A       History of Recent Ventricular Arrhythmia: N/A       Inability to Ambulate Safely: N/A       Need for Therapeutic Anticoagulation: N/A       Antiplatelet or Contrast Allergy: N/A This a 67 year old male PMH:Pt with atypical chest pain and abnormal stress in 2012  s/p CABG in [place] 2012. Recurrent chest pain and Angina 3/2014 with cardiac cath : Patent LIMA - LAD, patent SVG - D2 , RPDA, SVG - LCX closed . Native LCX 90 % lesion and received a MARCELINA stent native LCX . Pt with recurrent symptoms of exertional dyspnea in 8/18 presented to  and taken to Cardiac cath lab. Cath  revealed patent grafts and stents EF 40% with inferior HK . His Ef was unchanged from     Mallampati   ASA   GFR   BRA   Symptoms:        Angina (Class): Class 2- 3 SOB and fatigue Angina equivalent        Ischemic Symptoms:     Heart Failure: none        Systolic/Diastolic/Combined:        NYHA Class (within 2 weeks):     Assessment of LVEF (Must be within 6 months):       EF:        Assessed by:        Date:     Prior Cardiac Interventions (LHC, stents, CABG):  s/p CABG in [place]   s/p Cardiac cath 2018 patent grafts and stents   s/p Cardiac cath 2018          PCI's (Date, Stents, Vessels):        CABG (Date, Grafts):     Noninvasive Testing:   Stress Test: Date:        Protocol:        Duration of Exercise:        Symptoms:        EKG Changes:        DTS:        Myocardial Imaging:        Risk Assessment (Low, Medium, High):     Echo (Date, Findings):     Antianginal Therapies:        Beta Blockers:         Calcium Channel Blockers:        Long Acting Nitrates:        Ranexa:     Associated Risk Factors:        Cerebrovascular Disease: N/A       Chronic Lung Disease: N/A       Peripheral Arterial Disease: N/A       Chronic Kidney Disease (if yes, what is GFR): N/A       Uncontrolled Diabetes (if yes, what is HgbA1C or FBS): N/A       Poorly Controlled Hypertension (if yes, what is SBP): N/A       Morbid Obesity (if yes, what is BMI): N/A       History of Recent Ventricular Arrhythmia: N/A       Inability to Ambulate Safely: N/A       Need for Therapeutic Anticoagulation: N/A       Antiplatelet or Contrast Allergy: N/A This a 67 year old male PMH:Pt with atypical chest pain and abnormal stress in 2012  s/p CABG in [place] 2012. Recurrent chest pain and Angina 3/2014 with cardiac cath : Patent LIMA - LAD, patent SVG - D2 , RPDA, SVG - LCX closed . Native LCX 90 % lesion and received a MARCELINA stent native LCX . Pt with recurrent symptoms of exertional dyspnea in 8/18 presented to  and taken to Cardiac cath lab. Cath  revealed patent grafts and stents EF 40% with inferior HK . His EF was unchanged from previous echo's but a decline from 2014. He presented again to cardiologist 11/8/19 with recurrent episodes of dyspnea  wit heavy lifting and with exercise   A NST was done revealing moderate to severe defect in basal inferior and inferior-lateral walls that are fixed suggestive if infarction with minimal thelma-infarct ischemia. Post stress EF 46%   EKG with ST depressions in II, III, V4- V6 and frequent PVCs     Mallampati   ASA   GFR   BRA   Symptoms:        Angina (Class): Class 2- 3 SOB and fatigue Angina equivalent        Ischemic Symptoms:     Heart Failure: none        Systolic/Diastolic/Combined:        NYHA Class (within 2 weeks):     Assessment of LVEF (Must be within 6 months):       EF:        Assessed by:        Date:     Prior Cardiac Interventions (LHC, stents, CABG):  s/p Cardiac cath 2018 patent grafts and stents LCX   s/p Cardiac cath 2014 patent LIMA and SVG / closed SVG LCX , MARCELINA native LCX   s/p CABG 2012           Noninvasive Testing: NST   Stress Test: Date: 11/12/19       Protocol: Jerel        Duration of Exercise: 6 minutes 7 mets        Symptoms: shortness of breath        EKG Changes: . Post stress EF 46% EKG with ST depressions in II, III, V4- V6 and frequent PVCs        DTS:        Myocardial Imaging: moderate to severe defect in basal inferior and inferior-lateral walls that are fixed suggestive if infarction with minimal thelma-infarct ischemia       Risk Assessment (Low, Medium, High):     Echo 11/12/19 : Mildly decreased EF 45- 50% , basal and mid inferolateral wall, basal inferior segment , mid and apical inferior wall abnormality   Severely increased LV size and cavity , Moderate AS ,Moderate Mitral valve regurgitation, Grade 1 diastolic dysfunction , mild pulmonary HTN , no evien      Antianginal Therapies:        Beta Blockers: Toprol        Calcium Channel Blockers:        Long Acting Nitrates:        Ranexa:     Associated Risk Factors:        Cerebrovascular Disease: N/A       Chronic Lung Disease: N/A       Peripheral Arterial Disease: N/A       Chronic Kidney Disease (if yes, what is GFR): N/A       Uncontrolled Diabetes (if yes, what is HgbA1C or FBS): N/A       Poorly Controlled Hypertension (if yes, what is SBP): N/A       Morbid Obesity (if yes, what is BMI): N/A       History of Recent Ventricular Arrhythmia: N/A       Inability to Ambulate Safely: N/A       Need for Therapeutic Anticoagulation: N/A       Antiplatelet or Contrast Allergy: N/A This a 67 year old male PMH:Pt with atypical chest pain and abnormal stress in 2012  s/p CABG in [place] 2012. Recurrent chest pain and Angina   3/2014 with cardiac cath : Patent LIMA - LAD, patent SVG - D2 , RPDA, SVG - LCX closed . Native LCX 90 % lesion and received a MARCELINA stent native LCX .  3/7/16 Cardiac cath : Patent LIMA- LAD Patent SVG - Diagonal , patent SVG - RCA: Occluded SVG - LCX; OM1 - 70 % stenosis -MARCELINA resolute 2.5x12  , New LM 70 % disease s/p MARCELINA Resolute 4.0x15 , EDP 25   8/28/19 Cardiac cath : Patent LIMA-LAD , Patebt SVG - D1 , Patent SVG - RCA, Patent Stents - OM1 / LCX EF 40 %   He presented again to cardiologist 11/8/19 with recurrent episodes of dyspnea  with heavy lifting and with exercise.   A NST was done revealing moderate to severe defect in basal inferior and inferior-lateral walls that are fixed suggestive if infarction with minimal thelma-infarct ischemia. Post stress EF 46%   EKG with ST depressions in II, III, V4- V6 and frequent PVCs   A echo11/19  also revealed Moderate AS and Mild moderate Mitral regurgitation   He was recommended to have a cardiac cath for evaluation of his coronaries   He reports compliance with medications . He denies any further episodes of chest pain  or SOB. He denies fever , chills weight gain or loss. , no black or blood in stool  He reports intermittent leg edema that subsides with elevation     Mallampati 2  ASA 2  GFR   BRA   Symptoms:        Angina (Class): Class 2- 3 SOB and fatigue Angina equivalent        Ischemic Symptoms:     Heart Failure: none        Systolic/Diastolic/Combined:        NYHA Class (within 2 weeks):     Assessment of LVEF (Must be within 6 months):       EF: 45- 50 %        Assessed by: Echo        Date: 11/12/19     Prior Cardiac Interventions (LHC, stents, CABG):above           Noninvasive Testing: NST   Stress Test: Date: 11/12/19       Protocol: Jerel        Duration of Exercise: 6 minutes 7 mets        Symptoms: shortness of breath        EKG Changes: . Post stress EF 46% EKG with ST depressions in II, III, V4- V6 and frequent PVCs             Myocardial Imaging: moderate to severe defect in basal inferior and inferior-lateral walls that are fixed suggestive if infarction with minimal thelma-infarct ischemia       Risk Assessment (Low, Medium, High):     Echo 11/12/19 : Mildly decreased EF 45- 50% , basal and mid inferolateral wall, basal inferior segment , mid and apical inferior wall abnormality   Severely increased LV size and cavity , Moderate AS ,Moderate Mitral valve regurgitation, Grade 1 diastolic dysfunction , mild pulmonary HTN , no evien      Antianginal Therapies:        Beta Blockers: Toprol        Calcium Channel Blockers:        Long Acting Nitrates:        Ranexa:     Associated Risk Factors:        Cerebrovascular Disease: N/A       Chronic Lung Disease: N/A       Peripheral Arterial Disease: N/A       Chronic Kidney Disease (if yes, what is GFR): N/A       Uncontrolled Diabetes (if yes, what is HgbA1C or FBS): N/A       Poorly Controlled Hypertension (if yes, what is SBP): N/A       Morbid Obesity (if yes, what is BMI): N/A       History of Recent Ventricular Arrhythmia: N/A       Inability to Ambulate Safely: N/A       Need for Therapeutic Anticoagulation: N/A       Antiplatelet or Contrast Allergy: N/A This a 67 year old male PMH:Pt with atypical chest pain and abnormal stress in 2012  s/p CABG in [place] 2012. Recurrent chest pain and Angina   3/2014 with cardiac cath : Patent LIMA - LAD, patent SVG - D2 , RPDA, SVG - LCX closed . Native LCX 90 % lesion and received a MARCELINA stent native LCX .  3/7/16 Cardiac cath : Patent LIMA- LAD Patent SVG - Diagonal , patent SVG - RCA: Occluded SVG - LCX; OM1 - 70 % stenosis -MARCELINA resolute 2.5x12  , New LM 70 % disease s/p MARCELINA Resolute 4.0x15 , EDP 25   8/28/19 Cardiac cath : Patent LIMA-LAD , Patebt SVG - D1 , Patent SVG - RCA, Patent Stents - OM1 / LCX EF 40 %   He presented again to cardiologist 11/8/19 with recurrent episodes of dyspnea  with heavy lifting and with exercise.   A NST was done revealing moderate to severe defect in basal inferior and inferior-lateral walls that are fixed suggestive if infarction with minimal thelma-infarct ischemia. Post stress EF 46%   EKG with ST depressions in II, III, V4- V6 and frequent PVCs   A echo11/19  also revealed Moderate AS and Mild moderate Mitral regurgitation   He was recommended to have a cardiac cath for evaluation of his coronaries   He reports compliance with medications . He denies any further episodes of chest pain  or SOB. He denies fever , chills weight gain or loss. , no black or blood in stool  He reports intermittent leg edema that subsides with elevation     Mallampati 2  ASA 2  GFR 86  BRA 0.7%  Symptoms:        Angina (Class): Class 2- 3 SOB and fatigue Angina equivalent        Ischemic Symptoms:     Heart Failure: ICM EF 40 - 45 %        Systolic/Diastolic/Combined:        NYHA Class (within 2 weeks):         LVEF assessment        EF: 45- 50 %        Assessed by: Echo        Date: 11/12/19     Prior Cardiac Interventions (LHC, stents, CABG):above           Noninvasive Testing: NST   Stress Test: Date: 11/12/19       Protocol: Jerel        Duration of Exercise: 6 minutes 7 mets        Symptoms: shortness of breath        EKG Changes: . Post stress EF 46% EKG with ST depressions in II, III, V4- V6 and frequent PVCs             Myocardial Imaging: moderate to severe defect in basal inferior and inferior-lateral walls that are fixed suggestive if infarction with minimal thelma-infarct ischemia       Risk Assessment (Low, Medium, High):     Echo 11/12/19 : Mildly decreased EF 45- 50% , basal and mid inferolateral wall, basal inferior segment , mid and apical inferior wall abnormality   Severely increased LV size and cavity , Moderate AS ,Moderate Mitral valve regurgitation, Grade 1 diastolic dysfunction , mild pulmonary HTN , no evien      Antianginal Therapies:        Beta Blockers: Toprol        Calcium Channel Blockers:        Long Acting Nitrates:        Ranexa:     Associated Risk Factors:        Cerebrovascular Disease: N/A       Chronic Lung Disease: N/A       Peripheral Arterial Disease: N/A       Chronic Kidney Disease (if yes, what is GFR): N/A       Uncontrolled Diabetes (if yes, what is HgbA1C or FBS): N/A       Poorly Controlled Hypertension (if yes, what is SBP): N/A       Morbid Obesity (if yes, what is BMI): N/A       History of Recent Ventricular Arrhythmia: N/A       Inability to Ambulate Safely: N/A       Need for Therapeutic Anticoagulation: N/A       Antiplatelet or Contrast Allergy: N/A

## 2019-12-06 NOTE — H&P PST ADULT - NSICDXFAMILYHX_GEN_ALL_CORE_FT
FAMILY HISTORY:  Family history of acute myocardial infarction  Family history of coronary artery disease    Sibling  Still living? Yes, Estimated age: 51-60  Family history of coronary artery disease, Age at diagnosis: 51-60

## 2019-12-06 NOTE — ASU PATIENT PROFILE, ADULT - VISION (WITH CORRECTIVE LENSES IF THE PATIENT USUALLY WEARS THEM):
reading/Partially impaired: cannot see medication labels or newsprint, but can see obstacles in path, and the surrounding layout; can count fingers at arm's length reading/Normal vision: sees adequately in most situations; can see medication labels, newsprint

## 2019-12-06 NOTE — ASU PATIENT PROFILE, ADULT - NS PRO MODE OF ARRIVAL
Detail Level: Zone Consent: Written consent obtained, risks reviewed including but not limited to crusting, scabbing, blistering, scarring, darker or lighter pigmentary change, incidental hair removal, bruising, and/or incomplete removal. ambulatory Location #1: nasal dorsum, upper and lower lip, forehead Total Pulses (Optional): 39 Treatment Number: 9 Mode: repeat paint Device Serial Number (Optional): [id number] Fluence Units: J/cm2 Spot Size: 2 x 2 cm Post-Care Instructions: I reviewed with the patient in detail post-care instructions. Patient should stay away from the sun and wear sun protection until treated areas are fully healed. Fluence #1 (J/Cm2 Or Mj/Cm2): 450 Total Square Area In Cm2 (Required For Proper Billing- Whole Numbers Only Please): 88 Comments: Patient states that she experienced moderate sunburn sensation with tenderness on the cheeks <12 hours after her last treatment.\\n\\n***We omitted treatment today on the cheeks, to allow the sensitivity to calm down.

## 2019-12-10 ENCOUNTER — TRANSCRIPTION ENCOUNTER (OUTPATIENT)
Age: 69
End: 2019-12-10

## 2019-12-10 ENCOUNTER — INPATIENT (INPATIENT)
Facility: HOSPITAL | Age: 69
LOS: 0 days | Discharge: HOME | DRG: 247 | End: 2019-12-11
Attending: INTERNAL MEDICINE | Admitting: INTERNAL MEDICINE
Payer: MEDICARE

## 2019-12-10 VITALS
TEMPERATURE: 98 F | SYSTOLIC BLOOD PRESSURE: 151 MMHG | DIASTOLIC BLOOD PRESSURE: 72 MMHG | HEART RATE: 82 BPM | RESPIRATION RATE: 15 BRPM | OXYGEN SATURATION: 96 %

## 2019-12-10 DIAGNOSIS — I25.10 ATHEROSCLEROTIC HEART DISEASE OF NATIVE CORONARY ARTERY WITHOUT ANGINA PECTORIS: ICD-10-CM

## 2019-12-10 DIAGNOSIS — Z95.1 PRESENCE OF AORTOCORONARY BYPASS GRAFT: Chronic | ICD-10-CM

## 2019-12-10 DIAGNOSIS — Z90.49 ACQUIRED ABSENCE OF OTHER SPECIFIED PARTS OF DIGESTIVE TRACT: Chronic | ICD-10-CM

## 2019-12-10 DIAGNOSIS — I25.810 ATHEROSCLEROSIS OF CORONARY ARTERY BYPASS GRAFT(S) WITHOUT ANGINA PECTORIS: ICD-10-CM

## 2019-12-10 DIAGNOSIS — Z95.5 PRESENCE OF CORONARY ANGIOPLASTY IMPLANT AND GRAFT: Chronic | ICD-10-CM

## 2019-12-10 DIAGNOSIS — Z98.89 OTHER SPECIFIED POSTPROCEDURAL STATES: Chronic | ICD-10-CM

## 2019-12-10 LAB
BASOPHILS # BLD AUTO: 0.04 K/UL — SIGNIFICANT CHANGE UP (ref 0–0.2)
BASOPHILS NFR BLD AUTO: 0.6 % — SIGNIFICANT CHANGE UP (ref 0–2)
EOSINOPHIL # BLD AUTO: 0.21 K/UL — SIGNIFICANT CHANGE UP (ref 0–0.5)
EOSINOPHIL NFR BLD AUTO: 3.1 % — SIGNIFICANT CHANGE UP (ref 0–6)
HBA1C BLD-MCNC: 6.3 % — HIGH (ref 4–5.6)
HCT VFR BLD CALC: 41.1 % — SIGNIFICANT CHANGE UP (ref 39–50)
HGB BLD-MCNC: 13.6 G/DL — SIGNIFICANT CHANGE UP (ref 13–17)
IMM GRANULOCYTES NFR BLD AUTO: 0.3 % — SIGNIFICANT CHANGE UP (ref 0–1.5)
LYMPHOCYTES # BLD AUTO: 0.95 K/UL — LOW (ref 1–3.3)
LYMPHOCYTES # BLD AUTO: 14.2 % — SIGNIFICANT CHANGE UP (ref 13–44)
MCHC RBC-ENTMCNC: 28 PG — SIGNIFICANT CHANGE UP (ref 27–34)
MCHC RBC-ENTMCNC: 33.1 GM/DL — SIGNIFICANT CHANGE UP (ref 32–36)
MCV RBC AUTO: 84.7 FL — SIGNIFICANT CHANGE UP (ref 80–100)
MONOCYTES # BLD AUTO: 0.42 K/UL — SIGNIFICANT CHANGE UP (ref 0–0.9)
MONOCYTES NFR BLD AUTO: 6.3 % — SIGNIFICANT CHANGE UP (ref 2–14)
NEUTROPHILS # BLD AUTO: 5.05 K/UL — SIGNIFICANT CHANGE UP (ref 1.8–7.4)
NEUTROPHILS NFR BLD AUTO: 75.5 % — SIGNIFICANT CHANGE UP (ref 43–77)
PLATELET # BLD AUTO: 279 K/UL — SIGNIFICANT CHANGE UP (ref 150–400)
RBC # BLD: 4.85 M/UL — SIGNIFICANT CHANGE UP (ref 4.2–5.8)
RBC # FLD: 13.3 % — SIGNIFICANT CHANGE UP (ref 10.3–14.5)
WBC # BLD: 6.69 K/UL — SIGNIFICANT CHANGE UP (ref 3.8–10.5)
WBC # FLD AUTO: 6.69 K/UL — SIGNIFICANT CHANGE UP (ref 3.8–10.5)

## 2019-12-10 PROCEDURE — 99152 MOD SED SAME PHYS/QHP 5/>YRS: CPT

## 2019-12-10 PROCEDURE — 92937 PRQ TRLUML REVSC CAB GRF 1: CPT | Mod: LD

## 2019-12-10 PROCEDURE — 92928 PRQ TCAT PLMT NTRAC ST 1 LES: CPT | Mod: LD

## 2019-12-10 PROCEDURE — 93010 ELECTROCARDIOGRAM REPORT: CPT

## 2019-12-10 PROCEDURE — 93461 R&L HRT ART/VENTRICLE ANGIO: CPT | Mod: 26,XU

## 2019-12-10 RX ORDER — CHLORHEXIDINE GLUCONATE 213 G/1000ML
1 SOLUTION TOPICAL ONCE
Refills: 0 | Status: DISCONTINUED | OUTPATIENT
Start: 2019-12-10 | End: 2019-12-11

## 2019-12-10 RX ORDER — METOPROLOL TARTRATE 50 MG
25 TABLET ORAL DAILY
Refills: 0 | Status: DISCONTINUED | OUTPATIENT
Start: 2019-12-10 | End: 2019-12-11

## 2019-12-10 RX ORDER — FINASTERIDE 5 MG/1
5 TABLET, FILM COATED ORAL DAILY
Refills: 0 | Status: DISCONTINUED | OUTPATIENT
Start: 2019-12-10 | End: 2019-12-11

## 2019-12-10 RX ORDER — SODIUM CHLORIDE 9 MG/ML
400 INJECTION INTRAMUSCULAR; INTRAVENOUS; SUBCUTANEOUS
Refills: 0 | Status: DISCONTINUED | OUTPATIENT
Start: 2019-12-10 | End: 2019-12-11

## 2019-12-10 RX ORDER — SODIUM CHLORIDE 9 MG/ML
1000 INJECTION INTRAMUSCULAR; INTRAVENOUS; SUBCUTANEOUS
Refills: 0 | Status: DISCONTINUED | OUTPATIENT
Start: 2019-12-10 | End: 2019-12-10

## 2019-12-10 RX ORDER — ALPRAZOLAM 0.25 MG
0.25 TABLET ORAL ONCE
Refills: 0 | Status: DISCONTINUED | OUTPATIENT
Start: 2019-12-10 | End: 2019-12-10

## 2019-12-10 RX ORDER — FENTANYL CITRATE 50 UG/ML
25 INJECTION INTRAVENOUS ONCE
Refills: 0 | Status: DISCONTINUED | OUTPATIENT
Start: 2019-12-10 | End: 2019-12-10

## 2019-12-10 RX ORDER — ONDANSETRON 8 MG/1
4 TABLET, FILM COATED ORAL EVERY 6 HOURS
Refills: 0 | Status: DISCONTINUED | OUTPATIENT
Start: 2019-12-10 | End: 2019-12-11

## 2019-12-10 RX ORDER — ASPIRIN/CALCIUM CARB/MAGNESIUM 324 MG
81 TABLET ORAL DAILY
Refills: 0 | Status: DISCONTINUED | OUTPATIENT
Start: 2019-12-10 | End: 2019-12-11

## 2019-12-10 RX ORDER — ATORVASTATIN CALCIUM 80 MG/1
80 TABLET, FILM COATED ORAL AT BEDTIME
Refills: 0 | Status: DISCONTINUED | OUTPATIENT
Start: 2019-12-10 | End: 2019-12-11

## 2019-12-10 RX ORDER — LOSARTAN POTASSIUM 100 MG/1
100 TABLET, FILM COATED ORAL DAILY
Refills: 0 | Status: DISCONTINUED | OUTPATIENT
Start: 2019-12-10 | End: 2019-12-11

## 2019-12-10 RX ORDER — ALPRAZOLAM 0.25 MG
0.25 TABLET ORAL EVERY 6 HOURS
Refills: 0 | Status: DISCONTINUED | OUTPATIENT
Start: 2019-12-10 | End: 2019-12-11

## 2019-12-10 RX ORDER — ACETAMINOPHEN 500 MG
650 TABLET ORAL EVERY 6 HOURS
Refills: 0 | Status: DISCONTINUED | OUTPATIENT
Start: 2019-12-10 | End: 2019-12-11

## 2019-12-10 RX ORDER — CLOPIDOGREL BISULFATE 75 MG/1
75 TABLET, FILM COATED ORAL DAILY
Refills: 0 | Status: DISCONTINUED | OUTPATIENT
Start: 2019-12-10 | End: 2019-12-11

## 2019-12-10 RX ADMIN — Medication 25 MILLIGRAM(S): at 22:18

## 2019-12-10 RX ADMIN — FENTANYL CITRATE 25 MICROGRAM(S): 50 INJECTION INTRAVENOUS at 16:15

## 2019-12-10 RX ADMIN — FENTANYL CITRATE 25 MICROGRAM(S): 50 INJECTION INTRAVENOUS at 16:49

## 2019-12-10 RX ADMIN — FENTANYL CITRATE 25 MICROGRAM(S): 50 INJECTION INTRAVENOUS at 14:45

## 2019-12-10 RX ADMIN — FENTANYL CITRATE 25 MICROGRAM(S): 50 INJECTION INTRAVENOUS at 14:23

## 2019-12-10 RX ADMIN — Medication 0.25 MILLIGRAM(S): at 22:18

## 2019-12-10 RX ADMIN — SODIUM CHLORIDE 50 MILLILITER(S): 9 INJECTION INTRAMUSCULAR; INTRAVENOUS; SUBCUTANEOUS at 08:05

## 2019-12-10 RX ADMIN — Medication 0.25 MILLIGRAM(S): at 23:22

## 2019-12-10 RX ADMIN — CLOPIDOGREL BISULFATE 75 MILLIGRAM(S): 75 TABLET, FILM COATED ORAL at 08:15

## 2019-12-10 NOTE — DISCHARGE NOTE PROVIDER - HOSPITAL COURSE
s/p LHC RFA w/angioseal closure    s/p RHC RFV s/p RHC #7    s/p LHC with angioseal closure    MARCELINA x2 Resolute LAUREN 2.75x12 to distal LIMA LAD and 2.5x18 to dLAD  80%native after LIMA · Assessment        This a 67 year old male PMH: Pt with atypical chest pain and abnormal stress in 2012  s/p CABG in [place] 2012. Recurrent chest pain and Angina     3/2014 with cardiac cath : Patent LIMA - LAD, patent SVG - D2 , RPDA, SVG - LCX closed . Native LCX 90 % lesion and received a MARCELINA stent native LCX .    3/7/16 Cardiac cath : Patent LIMA- LAD Patent SVG - Diagonal , patent SVG - RCA: Occluded SVG - LCX; OM1 - 70 % stenosis -MARCELINA resolute 2.5x12  , New LM 70 % disease s/p MARCELINA Resolute 4.0x15 , EDP 25     8/28/19 Cardiac cath : Patent LIMA-LAD , Patebt SVG - D1 , Patent SVG - RCA, Patent Stents - OM1 / LCX EF 40 %     He presented again to cardiologist 11/8/19 with recurrent episodes of dyspnea  with heavy lifting and with exercise.     A NST was done revealing moderate to severe defect in basal inferior and inferior-lateral walls that are fixed suggestive if infarction with minimal thelma-infarct ischemia. Post stress EF 46%     EKG with ST depressions in II, III, V4- V6 and frequent PVCs     A echo11/19  also revealed Moderate AS and Mild moderate Mitral regurgitation     He was recommended to have a cardiac cath for evaluation of his coronaries     He reports compliance with medications . He denies any further episodes of chest pain  or SOB. He denies fever , chills weight gain or loss. , no black or blood in stool  He reports intermittent leg edema that subsides with elevation         Now s/p RHC #7    s/p LHC with angioseal closure    MARCELINA x2 Resolute LAUREN 2.75x12 to distal LIMA LAD and 2.5x18 to dLAD native after LIMA 80%            Problem/Plan - 1:    ·  Problem: CAD (coronary artery disease) of bypass graft.  Plan: s/p LHC x 1 MARCELINA to dLIMA-LAD and x 1 MARCELINA to dLAD    continue ASA, Plavix, lipitor, losartan, toprol     Diet/lifestyle modifications and medication compliance heavily reinforced     Pt with stable vital signs, telemetry stable, physical exam unremarkable and unchanged from pre-procedural baseline, neurovascularly intact, ambulating, eating, review of systems completely negative. pt verbalized an understanding of the discharge teaching. Patient to follow up with Dr. Oviedo within 2 weeks.         Problem/Plan - 2:    ·  Problem: S/P coronary angiogram.  Plan: cardiac rehab info provided/referral and communication to cardiac rehab completed     Patient educated about groin site care, signs and symptoms of complication post procedure, and plan of care moving forward. Patient verbalized understanding with teach-back.

## 2019-12-10 NOTE — DISCHARGE NOTE PROVIDER - INSTRUCTIONS
low fat  low salt  Plant based diabetic food choices Choose lean meats and poultry without skin and prepare them without added saturated and trans fat.  Eat fish at least twice a week. Recent research shows that eating oily fish containing omega-3 fatty acids (for example, salmon, trout and herring) may help lower your risk of death from coronary artery disease.  Select fat-free, 1 percent fat and low-fat dairy products.  Cut back on foods containing partially hydrogenated vegetable oils to reduce trans fat in your diet.   To lower cholesterol, reduce saturated fat to no more than 5 to 6 percent of total calories. For someone eating 2,000 calories a day, that’s about 13 grams of saturated fat.  Cut back on beverages and foods with added sugars.  Choose and prepare foods with little or no salt. To lower blood pressure, aim to eat no more than 2,400 milligrams of sodium per day. Reducing daily intake to 1,500 mg is desirable because it can lower blood pressure even further.

## 2019-12-10 NOTE — DISCHARGE NOTE PROVIDER - NSDCFUADDINST_GEN_ALL_CORE_FT
No heavy lifting, driving, sex, tub baths, swimming, or any activity that submerges the lower half of the body in water for 48 hours.  Limited walking and stairs for 48 hours.    Change the bandaid after 24 hours and every 24 hours after that.  Keep the puncture site dry and covered with a bandaid until a scab forms.    Observe the site frequently.  If bleeding or a large lump (the size of a golf ball or bigger) occurs lie flat, apply continuous direct pressure just above the puncture site for at least 10 minutes, and notify your physician immediately.  If the bleeding cannot be controlled, call 911 immediately for assistance.  Notify your physician of pain, swelling or any drainage.    Notify your physician immediately if coldness, numbness, discoloration or pain in your foot occurs.  REMOVE RIGHT GROIN DRESSING WITHIN 24 HOURS

## 2019-12-10 NOTE — PROGRESS NOTE ADULT - SUBJECTIVE AND OBJECTIVE BOX
NPO> 8 hours  Aspirin/plavix with daily compliance  Labs reviewed  IVF 50/hr EF 40%  Dr Mercado to consent  BR 0.7%  History of Present Illness		  This a 67 year old male PMH:Pt with atypical chest pain and abnormal stress in 2012  s/p CABG in [place] 2012. Recurrent chest pain and Angina   3/2014 with cardiac cath : Patent LIMA - LAD, patent SVG - D2 , RPDA, SVG - LCX closed . Native LCX 90 % lesion and received a MARCELINA stent native LCX .  3/7/16 Cardiac cath : Patent LIMA- LAD Patent SVG - Diagonal , patent SVG - RCA: Occluded SVG - LCX; OM1 - 70 % stenosis -MARCELINA resolute 2.5x12  , New LM 70 % disease s/p MARCELINA Resolute 4.0x15 , EDP 25   8/28/19 Cardiac cath : Patent LIMA-LAD , Patebt SVG - D1 , Patent SVG - RCA, Patent Stents - OM1 / LCX EF 40 %   He presented again to cardiologist 11/8/19 with recurrent episodes of dyspnea  with heavy lifting and with exercise.   A NST was done revealing moderate to severe defect in basal inferior and inferior-lateral walls that are fixed suggestive if infarction with minimal thelma-infarct ischemia. Post stress EF 46%   EKG with ST depressions in II, III, V4- V6 and frequent PVCs   A echo11/19  also revealed Moderate AS and Mild moderate Mitral regurgitation   He was recommended to have a cardiac cath for evaluation of his coronaries   He reports compliance with medications . He denies any further episodes of chest pain  or SOB. He denies fever , chills weight gain or loss. , no black or blood in stool  He reports intermittent leg edema that subsides with elevation     Mallampati 2  ASA 2  GFR 86  BRA 0.7%  Symptoms:        Angina (Class): Class 2- 3 SOB and fatigue Angina equivalent        Ischemic Symptoms:     Heart Failure: ICM EF 40 - 45 %        Systolic/Diastolic/Combined:        NYHA Class (within 2 weeks):         LVEF assessment        EF: 45- 50 %        Assessed by: Echo        Date: 11/12/19     Prior Cardiac Interventions (LHC, stents, CABG):above           Noninvasive Testing: NST   Stress Test: Date: 11/12/19       Protocol: Jerel        Duration of Exercise: 6 minutes 7 mets        Symptoms: shortness of breath        EKG Changes: . Post stress EF 46% EKG with ST depressions in II, III, V4- V6 and frequent PVCs             Myocardial Imaging: moderate to severe defect in basal inferior and inferior-lateral walls that are fixed suggestive if infarction with minimal thelma-infarct ischemia       Risk Assessment (Low, Medium, High):     Echo 11/12/19 : Mildly decreased EF 45- 50% , basal and mid inferolateral wall, basal inferior segment , mid and apical inferior wall abnormality   Severely increased LV size and cavity , Moderate AS ,Moderate Mitral valve regurgitation, Grade 1 diastolic dysfunction , mild pulmonary HTN , no evien      Antianginal Therapies:        Beta Blockers: Toprol        Calcium Channel Blockers:        Long Acting Nitrates:        Ranexa:     Associated Risk Factors:        Cerebrovascular Disease: N/A       Chronic Lung Disease: N/A       Peripheral Arterial Disease: N/A       Chronic Kidney Disease (if yes, what is GFR): N/A       Uncontrolled Diabetes (if yes, what is HgbA1C or FBS): N/A       Poorly Controlled Hypertension (if yes, what is SBP): N/A       Morbid Obesity (if yes, what is BMI): N/A       History of Recent Ventricular Arrhythmia: N/A       Inability to Ambulate Safely: N/A       Need for Therapeutic Anticoagulation: N/A       Antiplatelet or Contrast Allergy: N/A    Allergies/Medications:   Allergies:        Allergies:  	No Known Allergies            REVIEW OF SYSTEMS:  Denies SOB, CP, NV, HA, dizziness, palpitations, site pain    PHYSICAL EXAM: A&Ox3 NAD Skin warm and dry  NEURO: Speech intact +gag +swallow Tongue midline TYSON  NECK: No JVD, trachea midline. Eupneic  HEART: sl irreg NSR on tele w/sinus arrythmia  PULMONARY:  CTA santosh  ABDOMEN: Soft nontender X4 +BS Vdg  EXTREMITIES: Rt and LT  Radial site: Rtand Lt radial pulse +Santosh DP/PTs+/palpable

## 2019-12-10 NOTE — PROGRESS NOTE ADULT - SUBJECTIVE AND OBJECTIVE BOX
s/p RHC #7  s/p LHC with angioseal closure  MARCELINA x2 Resolute LAUREN 2.75x12 to LIMA LAD and 2.5x18 to dLAD native ADMIT for intra procedural HTN, 2VCD PCI  s/p RHC #7  s/p LHC with angioseal closure  MARCELINA x2 Resolute LAUREN 2.75x12 to LIMA LAD and 2.5x18 to dLAD native ADMIT for intra procedural HTN, 2VCD PCI  s/p RHC #7  s/p LHC with angioseal closure  MARCELINA x2 Resolute LAUREN 2.75x12 to distal LIMA LAD and 2.5x18 to dLAD native after LIMA  Tolerated procedure well w/o complications  Seen post procedure by Dr Mercado with wife present            REVIEW OF SYSTEMS:  Denies SOB, CP, NV, HA, dizziness, palpitations, site pain    PHYSICAL EXAM: A&Ox3 NAD Skin warm and dry  NEURO: Speech intact +gag +swallow Tongue midline TYSON  NECK: No JVD, trachea midline. Eupneic  HEART: tele/ECG noted SR no ST elevation IW q waves noted  PULMONARY:  CTA santosh  ABDOMEN: Soft nontender X4 +BS Vdg  EXTREMITIES: RFA site: No bleed, hematoma, pain, ecchymosis or swelling Rt DP/PT+#7 RFV insitu ADMIT for intra procedural HTN, 2VCD PCI  s/p RHC #7  s/p LHC with angioseal closure  MARCELINA x2 Resolute LAUREN 2.75x12 to distal LIMA LAD and 2.5x18 to dLAD native after LIMA 80%  Tolerated procedure well w/o complications  Seen post procedure by Dr Mercado with wife present            REVIEW OF SYSTEMS:  Denies SOB, CP, NV, HA, dizziness, palpitations, site pain    PHYSICAL EXAM: A&Ox3 NAD Skin warm and dry  NEURO: Speech intact +gag +swallow Tongue midline TYSON  NECK: No JVD, trachea midline. Eupneic  HEART: tele/ECG noted SR no ST elevation IW q waves noted  PULMONARY:  CTA santosh  ABDOMEN: Soft nontender X4 +BS Vdg  EXTREMITIES: RFA site: No bleed, hematoma, pain, ecchymosis or swelling Rt DP/PT+#7 RFV insitu

## 2019-12-10 NOTE — PROGRESS NOTE ADULT - ASSESSMENT
A s/p A s/p RHC #7  s/p LHC with angioseal closure  MARCELINA x2 Resolute LAUREN 2.75x12 to distal LIMA LAD and 2.5x18 to dLAD native after LIMA A s/p RHC #7  s/p LHC with angioseal closure  MARCELINA x2 Resolute LAUREN 2.75x12 to distal LIMA LAD and 2.5x18 to dLAD native after LIMA 80%

## 2019-12-10 NOTE — DISCHARGE NOTE PROVIDER - NSDCCPCAREPLAN_GEN_ALL_CORE_FT
PRINCIPAL DISCHARGE DIAGNOSIS  Diagnosis: Cardiovascular disease  Assessment and Plan of Treatment: PRINCIPAL DISCHARGE DIAGNOSIS  Diagnosis: CAD (coronary artery disease) of bypass graft  Assessment and Plan of Treatment: Coronary artery disease is a condition where the arteries the supply the heart muscle get clogged with fatty deposits & puts you at risk for a heart attack  Call your doctor if you have any new pain, pressure, or discomfort in the center of your chest, pain, tingling or discomfort in arms, back, neck, jaw, or stomach, shortness of breath, nausea, vomiting, burping or heartburn, sweating, cold and clammy skin, racing or abnormal heartbeat for more than 10 minutes or if they keep coming & going.  Call 911 and do not tr to get to hospital by care  You can help yourself with lefestyle changes (quitting smoking if you smoke), eat lots of fruits & vegetables & low fat dairy products, not a lot of meat & fatty foods, walk or some form of physical activity most days of the week, lose weight if you are overweight  Take your cardiac medication as prescribed to lower cholesterol, to lower blood pressure, aspirin to prevent blood clots, and diabetes control  Make sure to keep appointments with doctor for cardiac follow up care

## 2019-12-10 NOTE — DISCHARGE NOTE PROVIDER - NSDCCPTREATMENT_GEN_ALL_CORE_FT
PRINCIPAL PROCEDURE  Procedure: Percutaneous coronary intervention with insertion of stent  Findings and Treatment: PRINCIPAL PROCEDURE  Procedure: Percutaneous coronary intervention with insertion of stent  Findings and Treatment: Now s/p RHC/LHC with angioseal closure  MARCELINA x2 Resolute LAUREN 2.75x12 to distal LIMA LAD and 2.5x18 to dLAD native after LIMA 80%

## 2019-12-10 NOTE — DISCHARGE NOTE PROVIDER - NSDCFUADDAPPT_GEN_ALL_CORE_FT
cardiac rehab info provided/referral and communication to cardiac rehab completed  FU Dr Oviedo 2-4 weeks  FU PCP for diabetic evaluation

## 2019-12-10 NOTE — DISCHARGE NOTE PROVIDER - CARE PROVIDER_API CALL
Edison Oviedo)  Nuclear Cardiology  [address] [facility], [address] [place] [address]  Phone: [phone]  Fax: [phone]  Follow Up Time: Edison Oviedo)  Nuclear Cardiology  [address] [facility], [address] [place] [address]  Phone: [phone]  Fax: [phone]  Follow Up Time: 2 weeks

## 2019-12-10 NOTE — PROGRESS NOTE ADULT - PROBLEM SELECTOR PLAN 1
cardiac rehab info provided/referral and communication to cardiac rehab completed  Rt groin care w/instruction to patient  HgbA1c 6.3 will need diabetic management  DAP unchanged  Continue BB/ACE/statin  Aggressive CV risk factor reduction measures enforced w/pt re diet, exercise, weight loss, blood sugar control and compliance

## 2019-12-10 NOTE — DISCHARGE NOTE PROVIDER - NSDCFUSCHEDAPPT_GEN_ALL_CORE_FT
UYEN LAZAR ; 12/24/2019 ; [facility] UYEN LAZAR ; 12/24/2019 ; [facility] UYEN LAZAR ; 12/24/2019 ; [facility] UYEN LAZAR ; 12/24/2019 ; [facility]

## 2019-12-10 NOTE — DISCHARGE NOTE PROVIDER - NSDCMRMEDTOKEN_GEN_ALL_CORE_FT
aspirin 81 mg oral tablet, chewable: 1 tab(s) orally once a day  finasteride 5 mg oral tablet: 0.5 tab(s) orally 3 times a week  losartan 100 mg oral tablet: 1 tab(s) orally once a day  metoprolol succinate 25 mg oral tablet, extended release: 1 tab(s) orally once a day  Plavix 75 mg oral tablet: 1 tab(s) orally once a day  rosuvastatin 20 mg oral tablet: 1 tab(s) orally once a day (at bedtime)  Tapazole 5 mg oral tablet: 1 tab(s) orally once a day

## 2019-12-11 ENCOUNTER — TRANSCRIPTION ENCOUNTER (OUTPATIENT)
Age: 69
End: 2019-12-11

## 2019-12-11 VITALS
TEMPERATURE: 98 F | HEART RATE: 65 BPM | DIASTOLIC BLOOD PRESSURE: 70 MMHG | RESPIRATION RATE: 18 BRPM | SYSTOLIC BLOOD PRESSURE: 151 MMHG | OXYGEN SATURATION: 96 %

## 2019-12-11 DIAGNOSIS — Z98.890 OTHER SPECIFIED POSTPROCEDURAL STATES: ICD-10-CM

## 2019-12-11 LAB
ANION GAP SERPL CALC-SCNC: 14 MMOL/L — SIGNIFICANT CHANGE UP (ref 5–17)
BUN SERPL-MCNC: 16 MG/DL — SIGNIFICANT CHANGE UP (ref 8–20)
CALCIUM SERPL-MCNC: 9.2 MG/DL — SIGNIFICANT CHANGE UP (ref 8.6–10.2)
CHLORIDE SERPL-SCNC: 100 MMOL/L — SIGNIFICANT CHANGE UP (ref 98–107)
CO2 SERPL-SCNC: 27 MMOL/L — SIGNIFICANT CHANGE UP (ref 22–29)
CREAT SERPL-MCNC: 0.97 MG/DL — SIGNIFICANT CHANGE UP (ref 0.5–1.3)
GLUCOSE SERPL-MCNC: 123 MG/DL — HIGH (ref 70–115)
HCT VFR BLD CALC: 43.9 % — SIGNIFICANT CHANGE UP (ref 39–50)
HGB BLD-MCNC: 14 G/DL — SIGNIFICANT CHANGE UP (ref 13–17)
MAGNESIUM SERPL-MCNC: 2.1 MG/DL — SIGNIFICANT CHANGE UP (ref 1.6–2.6)
MCHC RBC-ENTMCNC: 27.3 PG — SIGNIFICANT CHANGE UP (ref 27–34)
MCHC RBC-ENTMCNC: 31.9 GM/DL — LOW (ref 32–36)
MCV RBC AUTO: 85.7 FL — SIGNIFICANT CHANGE UP (ref 80–100)
PLATELET # BLD AUTO: 264 K/UL — SIGNIFICANT CHANGE UP (ref 150–400)
POTASSIUM SERPL-MCNC: 4.4 MMOL/L — SIGNIFICANT CHANGE UP (ref 3.5–5.3)
POTASSIUM SERPL-SCNC: 4.4 MMOL/L — SIGNIFICANT CHANGE UP (ref 3.5–5.3)
RBC # BLD: 5.12 M/UL — SIGNIFICANT CHANGE UP (ref 4.2–5.8)
RBC # FLD: 13.4 % — SIGNIFICANT CHANGE UP (ref 10.3–14.5)
SODIUM SERPL-SCNC: 141 MMOL/L — SIGNIFICANT CHANGE UP (ref 135–145)
TSH SERPL-MCNC: 3.59 UIU/ML — SIGNIFICANT CHANGE UP (ref 0.27–4.2)
WBC # BLD: 6.46 K/UL — SIGNIFICANT CHANGE UP (ref 3.8–10.5)
WBC # FLD AUTO: 6.46 K/UL — SIGNIFICANT CHANGE UP (ref 3.8–10.5)

## 2019-12-11 PROCEDURE — 36415 COLL VENOUS BLD VENIPUNCTURE: CPT

## 2019-12-11 PROCEDURE — 93461 R&L HRT ART/VENTRICLE ANGIO: CPT | Mod: XU

## 2019-12-11 PROCEDURE — C1725: CPT

## 2019-12-11 PROCEDURE — 84443 ASSAY THYROID STIM HORMONE: CPT

## 2019-12-11 PROCEDURE — 99153 MOD SED SAME PHYS/QHP EA: CPT

## 2019-12-11 PROCEDURE — C1887: CPT

## 2019-12-11 PROCEDURE — C9604: CPT | Mod: LD

## 2019-12-11 PROCEDURE — 83036 HEMOGLOBIN GLYCOSYLATED A1C: CPT

## 2019-12-11 PROCEDURE — C1769: CPT

## 2019-12-11 PROCEDURE — 80048 BASIC METABOLIC PNL TOTAL CA: CPT

## 2019-12-11 PROCEDURE — 93010 ELECTROCARDIOGRAM REPORT: CPT

## 2019-12-11 PROCEDURE — 99152 MOD SED SAME PHYS/QHP 5/>YRS: CPT

## 2019-12-11 PROCEDURE — C9600: CPT | Mod: LD

## 2019-12-11 PROCEDURE — C1894: CPT

## 2019-12-11 PROCEDURE — C1760: CPT

## 2019-12-11 PROCEDURE — C1889: CPT

## 2019-12-11 PROCEDURE — 83735 ASSAY OF MAGNESIUM: CPT

## 2019-12-11 PROCEDURE — 93005 ELECTROCARDIOGRAM TRACING: CPT

## 2019-12-11 PROCEDURE — C1874: CPT

## 2019-12-11 PROCEDURE — 85027 COMPLETE CBC AUTOMATED: CPT

## 2019-12-11 RX ORDER — ASPIRIN/CALCIUM CARB/MAGNESIUM 324 MG
1 TABLET ORAL
Qty: 90 | Refills: 3
Start: 2019-12-11 | End: 2020-12-04

## 2019-12-11 RX ORDER — CLOPIDOGREL BISULFATE 75 MG/1
1 TABLET, FILM COATED ORAL
Qty: 90 | Refills: 3
Start: 2019-12-11 | End: 2020-12-04

## 2019-12-11 RX ORDER — ASPIRIN/CALCIUM CARB/MAGNESIUM 324 MG
1 TABLET ORAL
Qty: 0 | Refills: 0 | DISCHARGE

## 2019-12-11 RX ADMIN — LOSARTAN POTASSIUM 100 MILLIGRAM(S): 100 TABLET, FILM COATED ORAL at 06:00

## 2019-12-11 RX ADMIN — Medication 81 MILLIGRAM(S): at 06:00

## 2019-12-11 NOTE — PROGRESS NOTE ADULT - SUBJECTIVE AND OBJECTIVE BOX
[place] [facility]                          [address] [address]                       Phone: [phone]. Fax:[phone]                      ________________________________________________           Reason for follow up/Overnight events: follow up post cath     HPI:  This a 67 year old male PMH:Pt with atypical chest pain and abnormal stress in 2012  s/p CABG in [place] 2012. Recurrent chest pain and Angina   3/2014 with cardiac cath : Patent LIMA - LAD, patent SVG - D2 , RPDA, SVG - LCX closed . Native LCX 90 % lesion and received a MARCELINA stent native LCX .  3/7/16 Cardiac cath : Patent LIMA- LAD Patent SVG - Diagonal , patent SVG - RCA: Occluded SVG - LCX; OM1 - 70 % stenosis -MARCELINA resolute 2.5x12  , New LM 70 % disease s/p MARCELINA Resolute 4.0x15 , EDP 25   8/28/19 Cardiac cath : Patent LIMA-LAD , Patebt SVG - D1 , Patent SVG - RCA, Patent Stents - OM1 / LCX EF 40 %   He presented again to cardiologist 11/8/19 with recurrent episodes of dyspnea  with heavy lifting and with exercise.   A NST was done revealing moderate to severe defect in basal inferior and inferior-lateral walls that are fixed suggestive if infarction with minimal thelma-infarct ischemia. Post stress EF 46%   EKG with ST depressions in II, III, V4- V6 and frequent PVCs   A echo11/19  also revealed Moderate AS and Mild moderate Mitral regurgitation   He was recommended to have a cardiac cath for evaluation of his coronaries   He reports compliance with medications . He denies any further episodes of chest pain  or SOB. He denies fever , chills weight gain or loss. , no black or blood in stool  He reports intermittent leg edema that subsides with elevation     Mallampati 2  ASA 2  GFR 86  BRA 0.7%    ROS: All review of systems negative unless indicated otherwise below.                          LAB RESULTS                   COMPLETE BLOOD COUNT( 11 Dec 2019 05:42 )                            14.0 g/dL  6.46 K/uL )---------------( 264 K/uL                        43.9 %      Automated Differential     Auto Basophil # - X      Auto Basophil % - X      Auto Eosinophil # - X      Auto Eosinophil % - X      Auto Immature Granulocyte # - X      Auto Immature Granulocyte % - X      Auto Lymphocyte # - X      Auto Lymphocyte % - X      Auto Monocyte # - X      Auto Monocyte % - X      Auto Neutrophil # - X      Auto Neutrophil % - X                                      CHEMISTRY                 Basic Metabolic Panel (12-11-19 @ 05:42)    141  |  100  |  16.0  ----------------------------<  123<H>  4.4   |  27.0  |  0.97    Ca    9.2      11 Dec 2019 05:42  Mg     2.1     12-11      PT/INR/PTT ( 06 Dec 2019 14:28 )                        :                       :      11.8         :       34.6                  .        .                   .              .           .       1.03        .                                                               CARDIOLOGY RESULTS: Official Report/Preliminary Verbal Reports    CATH:   s/p RHC #7  s/p LHC with angioseal closure  MARCELINA x2 Resolute LAUREN 2.75x12 to distal LIMA LAD and 2.5x18 to dLAD native after LIMA 80%  Tolerated procedure well w/o complications  Seen post procedure by Dr Mercado with wife present                          CARDIOLOGY REVIEW: Personally visualized and reviewed    EKG:   Telemetry Last 24h: NSR 60s 70s infrequent PAC       HOME MEDICATIONS:  aspirin 81 mg oral tablet, chewable: 1 tab(s) orally once a day (10 Dec 2019 07:36)  finasteride 5 mg oral tablet: 0.5 tab(s) orally 3 times a week (10 Dec 2019 07:36)  losartan 100 mg oral tablet: 1 tab(s) orally once a day (10 Dec 2019 07:36)  metoprolol succinate 25 mg oral tablet, extended release: 1 tab(s) orally once a day (10 Dec 2019 07:36)  Plavix 75 mg oral tablet: 1 tab(s) orally once a day (10 Dec 2019 07:36)  rosuvastatin 20 mg oral tablet: 1 tab(s) orally once a day (at bedtime) (10 Dec 2019 07:36)  Tapazole 5 mg oral tablet: 1 tab(s) orally once a day (10 Dec 2019 07:36)                             Current Admission Active Medications    acetaminophen   Tablet .. 650 milliGRAM(s) Oral every 6 hours PRN Mild Pain (1 - 3)  ALPRAZolam 0.25 milliGRAM(s) Oral every 6 hours PRN anxiety/sleep  aluminum hydroxide/magnesium hydroxide/simethicone Suspension 30 milliLiter(s) Oral every 4 hours PRN Dyspepsia  aspirin  chewable 81 milliGRAM(s) Oral daily  atorvastatin 80 milliGRAM(s) Oral at bedtime  chlorhexidine 4% Liquid 1 Application(s) Topical once  clopidogrel Tablet 75 milliGRAM(s) Oral daily  finasteride 5 milliGRAM(s) Oral daily  losartan 100 milliGRAM(s) Oral daily  methimazole 5 milliGRAM(s) Oral daily  metoprolol succinate ER 25 milliGRAM(s) Oral daily  ondansetron Injectable 4 milliGRAM(s) IV Push every 6 hours PRN Nausea and/or Vomiting  sodium chloride 0.9%. 400 milliLiter(s) (50 mL/Hr) IV Continuous <Continuous>                        PHYSICAL EXAM:    Vital Signs Last 24 Hrs  T(C): 36.8 (10 Dec 2019 07:39), Max: 36.8 (10 Dec 2019 07:39)  T(F): 98.2 (10 Dec 2019 07:39), Max: 98.2 (10 Dec 2019 07:39)  HR: 74 (11 Dec 2019 06:10) (54 - 82)  BP: 144/85 (11 Dec 2019 06:10) (123/77 - 184/84)  BP(mean): --  RR: 20 (11 Dec 2019 06:10) (8 - 20)  SpO2: 94% (11 Dec 2019 06:10) (94% - 98%)    GENERAL: NAD  NECK: Supple, No JVD  NERVOUS SYSTEM:  Alert & Oriented X3, non focal neuro exam.   CHEST/LUNG: clear lungs, No rales, rhonchi, wheezing, or rubs  HEART: Regular rate and rhythm; s1 and s2 auscultated, No murmurs, rubs, or gallops  ABDOMEN: Soft, Nontender, Nondistended; Bowel sounds present and normoactive.   EXTREMITIES:  2+ Peripheral Pulses, No clubbing, cyanosis, or edema  CATH SITE: Right groin benign s/p cath.  No bleeding, no ecchymosis, no hematoma. Extremity Warm to touch, with palpable distal pulses, and brisk capillary refill. [place] [facility]                          [address] [address]                       Phone: [phone]. Fax:[phone]                      ________________________________________________           Reason for follow up/Overnight events: follow up post cath     HPI:  This a 67 year old male PMH:Pt with atypical chest pain and abnormal stress in 2012  s/p CABG in [place] 2012. Recurrent chest pain and Angina   3/2014 with cardiac cath : Patent LIMA - LAD, patent SVG - D2 , RPDA, SVG - LCX closed . Native LCX 90 % lesion and received a MARCELINA stent native LCX .  3/7/16 Cardiac cath : Patent LIMA- LAD Patent SVG - Diagonal , patent SVG - RCA: Occluded SVG - LCX; OM1 - 70 % stenosis -MARCELINA resolute 2.5x12  , New LM 70 % disease s/p MARCELINA Resolute 4.0x15 , EDP 25   8/28/19 Cardiac cath : Patent LIMA-LAD , Patebt SVG - D1 , Patent SVG - RCA, Patent Stents - OM1 / LCX EF 40 %   He presented again to cardiologist 11/8/19 with recurrent episodes of dyspnea  with heavy lifting and with exercise.   A NST was done revealing moderate to severe defect in basal inferior and inferior-lateral walls that are fixed suggestive if infarction with minimal thelma-infarct ischemia. Post stress EF 46%   EKG with ST depressions in II, III, V4- V6 and frequent PVCs   A echo11/19  also revealed Moderate AS and Mild moderate Mitral regurgitation   He was recommended to have a cardiac cath for evaluation of his coronaries   He reports compliance with medications . He denies any further episodes of chest pain  or SOB. He denies fever , chills weight gain or loss. , no black or blood in stool  He reports intermittent leg edema that subsides with elevation     Mallampati 2  ASA 2  GFR 86  BRA 0.7%    ROS: All review of systems negative unless indicated otherwise below.                          LAB RESULTS                   COMPLETE BLOOD COUNT( 11 Dec 2019 05:42 )                            14.0 g/dL  6.46 K/uL )---------------( 264 K/uL                        43.9 %      Automated Differential     Auto Basophil # - X      Auto Basophil % - X      Auto Eosinophil # - X      Auto Eosinophil % - X      Auto Immature Granulocyte # - X      Auto Immature Granulocyte % - X      Auto Lymphocyte # - X      Auto Lymphocyte % - X      Auto Monocyte # - X      Auto Monocyte % - X      Auto Neutrophil # - X      Auto Neutrophil % - X                                      CHEMISTRY                 Basic Metabolic Panel (12-11-19 @ 05:42)    141  |  100  |  16.0  ----------------------------<  123<H>  4.4   |  27.0  |  0.97    Ca    9.2      11 Dec 2019 05:42  Mg     2.1     12-11      PT/INR/PTT ( 06 Dec 2019 14:28 )                        :                       :      11.8         :       34.6                  .        .                   .              .           .       1.03        .                                                               CARDIOLOGY RESULTS: Official Report/Preliminary Verbal Reports    CATH:   s/p RHC #7  s/p LHC with angioseal closure  MARCELINA x2 Resolute LAUREN 2.75x12 to distal LIMA LAD and 2.5x18 to dLAD native after LIMA 80%  Tolerated procedure well w/o complications  Seen post procedure by Dr Mercado with wife present                          CARDIOLOGY REVIEW: Personally visualized and reviewed    EKG: NSR 60s PAC, non specific ST segment changes which are unchanged from previous EKG   Telemetry Last 24h: NSR 60s 70s infrequent PAC       HOME MEDICATIONS:  aspirin 81 mg oral tablet, chewable: 1 tab(s) orally once a day (10 Dec 2019 07:36)  finasteride 5 mg oral tablet: 0.5 tab(s) orally 3 times a week (10 Dec 2019 07:36)  losartan 100 mg oral tablet: 1 tab(s) orally once a day (10 Dec 2019 07:36)  metoprolol succinate 25 mg oral tablet, extended release: 1 tab(s) orally once a day (10 Dec 2019 07:36)  Plavix 75 mg oral tablet: 1 tab(s) orally once a day (10 Dec 2019 07:36)  rosuvastatin 20 mg oral tablet: 1 tab(s) orally once a day (at bedtime) (10 Dec 2019 07:36)  Tapazole 5 mg oral tablet: 1 tab(s) orally once a day (10 Dec 2019 07:36)                             Current Admission Active Medications    acetaminophen   Tablet .. 650 milliGRAM(s) Oral every 6 hours PRN Mild Pain (1 - 3)  ALPRAZolam 0.25 milliGRAM(s) Oral every 6 hours PRN anxiety/sleep  aluminum hydroxide/magnesium hydroxide/simethicone Suspension 30 milliLiter(s) Oral every 4 hours PRN Dyspepsia  aspirin  chewable 81 milliGRAM(s) Oral daily  atorvastatin 80 milliGRAM(s) Oral at bedtime  chlorhexidine 4% Liquid 1 Application(s) Topical once  clopidogrel Tablet 75 milliGRAM(s) Oral daily  finasteride 5 milliGRAM(s) Oral daily  losartan 100 milliGRAM(s) Oral daily  methimazole 5 milliGRAM(s) Oral daily  metoprolol succinate ER 25 milliGRAM(s) Oral daily  ondansetron Injectable 4 milliGRAM(s) IV Push every 6 hours PRN Nausea and/or Vomiting  sodium chloride 0.9%. 400 milliLiter(s) (50 mL/Hr) IV Continuous <Continuous>                        PHYSICAL EXAM:    Vital Signs Last 24 Hrs  T(C): 36.8 (10 Dec 2019 07:39), Max: 36.8 (10 Dec 2019 07:39)  T(F): 98.2 (10 Dec 2019 07:39), Max: 98.2 (10 Dec 2019 07:39)  HR: 74 (11 Dec 2019 06:10) (54 - 82)  BP: 144/85 (11 Dec 2019 06:10) (123/77 - 184/84)  BP(mean): --  RR: 20 (11 Dec 2019 06:10) (8 - 20)  SpO2: 94% (11 Dec 2019 06:10) (94% - 98%)    GENERAL: NAD  NECK: Supple, No JVD  NERVOUS SYSTEM:  Alert & Oriented X3, non focal neuro exam.   CHEST/LUNG: clear lungs, No rales, rhonchi, wheezing, or rubs  HEART: Regular rate and rhythm; s1 and s2 auscultated, No murmurs, rubs, or gallops  ABDOMEN: Soft, Nontender, Nondistended; Bowel sounds present and normoactive.   EXTREMITIES:  2+ Peripheral Pulses, No clubbing, cyanosis, or edema  CATH SITE: Right groin benign s/p cath.  No bleeding, no ecchymosis, no hematoma. Extremity Warm to touch, with palpable distal pulses, and brisk capillary refill.

## 2019-12-11 NOTE — PROGRESS NOTE ADULT - PROBLEM SELECTOR PLAN 1
s/p LHC x 1 MARCELINA to dLIMA-LAD and x 1 MARCELINA to dLAD  continue ASA, Plavix, lipitor, losartan, toprol   Diet/lifestyle modifications and medication compliance heavily reinforced   Pt with stable vital signs, telemetry stable, physical exam unremarkable and unchanged from pre-procedural baseline, neurovascularly intact, ambulating, eating, review of systems completely negative. pt verbalized an understanding of the discharge teaching. Patient to follow up with Dr. Oviedo within 2 weeks

## 2019-12-11 NOTE — DISCHARGE NOTE NURSING/CASE MANAGEMENT/SOCIAL WORK - PATIENT PORTAL LINK FT
You can access the FollowMyHealth Patient Portal offered by [facility] by registering at the following website: [email or web address]. By joining [facility]’s FollowMyHealth portal, you will also be able to view your health information using other applications (apps) compatible with our system.

## 2019-12-12 ENCOUNTER — MEDICATION RENEWAL (OUTPATIENT)
Age: 69
End: 2019-12-12

## 2019-12-20 ENCOUNTER — APPOINTMENT (OUTPATIENT)
Dept: CARDIOLOGY | Facility: CLINIC | Age: 69
End: 2019-12-20
Payer: MEDICARE

## 2019-12-20 ENCOUNTER — NON-APPOINTMENT (OUTPATIENT)
Age: 69
End: 2019-12-20

## 2019-12-20 VITALS
HEART RATE: 64 BPM | BODY MASS INDEX: 30.75 KG/M2 | HEIGHT: 72 IN | SYSTOLIC BLOOD PRESSURE: 117 MMHG | WEIGHT: 227 LBS | DIASTOLIC BLOOD PRESSURE: 71 MMHG | OXYGEN SATURATION: 96 %

## 2019-12-20 PROCEDURE — 99214 OFFICE O/P EST MOD 30 MIN: CPT | Mod: 25

## 2019-12-20 PROCEDURE — 93000 ELECTROCARDIOGRAM COMPLETE: CPT

## 2019-12-23 ENCOUNTER — RX CHANGE (OUTPATIENT)
Age: 69
End: 2019-12-23

## 2020-01-03 ENCOUNTER — RX CHANGE (OUTPATIENT)
Age: 70
End: 2020-01-03

## 2020-02-02 NOTE — ASSESSMENT
[FreeTextEntry1] : Mr. Willoughby reports for follow up of hospital discharge. Now he is s/p [facility] with Angio-Seal closure\par MARCELINA x2 Resolute LAUREN 2.75x12 to distal LIMA LAD and 2.5x18 to dLAD native after LIMA 80% after recurrent episodes of dyspnea with heavy lifting and with exercise.  Today he states felling well, no more chest pain or SOB, palpitations, syncope or near syncope. He is trying to be healthy and adding more plant based diet. Advised him not to stop asa and plavix with out talking to us. \par \par - HLD- his recent LDL is 80, goal is <60. He has progressive coronary artery diseases despite being on Statin, starting on Repatha 140 mg/ml SQ injection every 2 weeks. \par - f/u in 4 months. \par Seen and examined by Dr. Oviedo. \par \par Josefina Morales, FLORENTIN.\par \par Agree with above assessment and plan.  I personally evaluated and examined Cici who is doing well post LAD intervention.  I agree with plans to restart Repatha given the progressive CAD and statin intolerance.  We discussed cardiac rehabiliation but he wants to return to his gym on his home.  He will follow up in 3-4 months. \par \par Edison Oviedo MD\par \par

## 2020-02-02 NOTE — REVIEW OF SYSTEMS
[Negative] : Constitutional [Feeling Fatigued] : not feeling fatigued [Shortness Of Breath] : no shortness of breath [Dyspnea on exertion] : not dyspnea during exertion [Chest Pain] : no chest pain [Lower Ext Edema] : no extremity edema [Palpitations] : no palpitations

## 2020-02-02 NOTE — PHYSICAL EXAM
[General Appearance - Well Developed] : well developed [Normal Appearance] : normal appearance [Well Groomed] : well groomed [General Appearance - In No Acute Distress] : no acute distress [General Appearance - Well Nourished] : well nourished [Normal Conjunctiva] : the conjunctiva exhibited no abnormalities [No Oral Pallor] : no oral pallor [No Oral Cyanosis] : no oral cyanosis [Normal Jugular Venous V Waves Present] : normal jugular venous V waves present [No Jugular Venous Bueno A Waves] : no jugular venous bueno A waves [Respiration, Rhythm And Depth] : normal respiratory rhythm and effort [Exaggerated Use Of Accessory Muscles For Inspiration] : no accessory muscle use [Auscultation Breath Sounds / Voice Sounds] : lungs were clear to auscultation bilaterally [Heart Rate And Rhythm] : heart rate and rhythm were normal [Heart Sounds] : normal S1 and S2 [Murmurs] : no murmurs present [Edema] : no peripheral edema present [Abdomen Soft] : soft [Abdomen Tenderness] : non-tender [Abnormal Walk] : normal gait [Nail Clubbing] : no clubbing of the fingernails [Cyanosis, Localized] : no localized cyanosis [Skin Turgor] : normal skin turgor [Oriented To Time, Place, And Person] : oriented to person, place, and time [] : no rash [Impaired Insight] : insight and judgment were intact [Affect] : the affect was normal [Memory Recent] : recent memory was not impaired [FreeTextEntry1] : PT pulses bilaterally

## 2020-02-02 NOTE — HISTORY OF PRESENT ILLNESS
[FreeTextEntry1] :  67 year old man with history of prediabetes, coronary disease S/P CABG in [place] who noted atypical chest pain and had an abnormal stress test back in 2012.  Cardiac cath in March 2014 after recurrent chest pain with patent LIMA to LAD, patent SVG to D2 and RPDA but closed graft to LCX and 90% native LCx lesion.  He underwent MARCELINA stent to native LCx and is doing well.  He denies chest pain and dyspnea.  Additionally, he was found to be hyperthyroid and is now on methimazole for treatment.  He tolerated cholecystectomy and hernia repair and had returned to the gym.  He has decided to continue with methimazole for the hyperthyroidism.  He could no longer afford the Repatha so is now on Crestor and tolerating it with recent LDL 84 mg/dL. In early August, he noted three weeks of exertional dyspnea.  He denied chest pain and felt it was related to the humidity but went to the ER at [facility].  Cardiac catheterization was performed with patent grafts and EF 40% with inferior HK which is unchanged from prior echos but a gradual decline from 2014.  He now feels well and is back to the gym, exercising without symptoms.  His last TSH was at goal by report but he is to see endocrine this month.   He has been diagnosed with prediabetes by the endocrinologist.  Carotid Dopplers with mild to moderate athersclerosis bilaterally.  He broke his ankle in December and is now back to the gym with no significant chest pain or dyspnea.  He continues to use a bone stimulator.  He is complaining of erectile dysfunction and tried Viagra which made him feel presyncopal.  He is tolerating Crestor with no myalgias.  Lipid levels at [facility].  He was recently treated with antibiotics for prostatitis. \par \par 11/8/2019\par Patient presents for routine follow up. He c/o couple episodes of dyspnea with heavy lifting and with exercise. He also c/o feeling fatigued over the last 3-4 months. He still exercises 3x/week, does cardio and machines, denies chest pain. but does feel winded at times. He denies lightheadedness, near syncope or syncope. He also has SOB at rest and when laying down that makes him take a deep breathe and it resolves. TTE from 10/2018 LVEF 50%, mild MR, and hypokinesis at apex. LHC in 8/2018 with patent grafts and stents. He reports compliance with his medications. He had recent bloodwork which was unremarkable and PFTs normal last year. \par \par 12/20/2019\par Mr. Willoughby reports for follow up of hospital discharge. Now he is s/p [facility] with Angio-Seal closure\par MARCELINA x2 Resolute LAUREN 2.75x12 to distal LIMA LAD and 2.5x18 to dLAD native after LIMA 80% after recurrent episodes of dyspnea with heavy lifting and with exercise.  Today he states felling well, no more chest pain or SOB, palpitations, syncope or near syncope. He is trying to be healthy and adding more plant based diet. Advised him not to stop asa and plavix with out talking to us. \par

## 2020-04-09 ENCOUNTER — APPOINTMENT (OUTPATIENT)
Dept: ENDOCRINOLOGY | Facility: CLINIC | Age: 70
End: 2020-04-09
Payer: MEDICARE

## 2020-04-09 PROCEDURE — G2012 BRIEF CHECK IN BY MD/QHP: CPT

## 2020-04-24 ENCOUNTER — APPOINTMENT (OUTPATIENT)
Dept: CARDIOLOGY | Facility: CLINIC | Age: 70
End: 2020-04-24
Payer: MEDICARE

## 2020-04-24 VITALS
DIASTOLIC BLOOD PRESSURE: 70 MMHG | WEIGHT: 221 LBS | SYSTOLIC BLOOD PRESSURE: 123 MMHG | HEART RATE: 71 BPM | BODY MASS INDEX: 29.93 KG/M2 | HEIGHT: 72 IN | OXYGEN SATURATION: 97 %

## 2020-04-24 PROCEDURE — 93000 ELECTROCARDIOGRAM COMPLETE: CPT

## 2020-04-24 PROCEDURE — 99214 OFFICE O/P EST MOD 30 MIN: CPT

## 2020-04-24 RX ORDER — TELMISARTAN 80 MG/1
80 TABLET ORAL DAILY
Qty: 90 | Refills: 1 | Status: DISCONTINUED | COMMUNITY
Start: 2020-03-27 | End: 2020-04-24

## 2020-05-08 ENCOUNTER — APPOINTMENT (OUTPATIENT)
Dept: CARDIOLOGY | Facility: CLINIC | Age: 70
End: 2020-05-08

## 2020-07-29 DIAGNOSIS — Z01.818 ENCOUNTER FOR OTHER PREPROCEDURAL EXAMINATION: ICD-10-CM

## 2020-08-01 ENCOUNTER — APPOINTMENT (OUTPATIENT)
Dept: DISASTER EMERGENCY | Facility: CLINIC | Age: 70
End: 2020-08-01

## 2020-08-02 LAB — SARS-COV-2 N GENE NPH QL NAA+PROBE: NOT DETECTED

## 2020-10-01 ENCOUNTER — APPOINTMENT (OUTPATIENT)
Dept: ENDOCRINOLOGY | Facility: CLINIC | Age: 70
End: 2020-10-01
Payer: MEDICARE

## 2020-10-01 VITALS
DIASTOLIC BLOOD PRESSURE: 90 MMHG | SYSTOLIC BLOOD PRESSURE: 142 MMHG | BODY MASS INDEX: 28.88 KG/M2 | HEIGHT: 74 IN | HEART RATE: 80 BPM | WEIGHT: 225 LBS

## 2020-10-01 PROCEDURE — 99213 OFFICE O/P EST LOW 20 MIN: CPT

## 2020-10-01 RX ORDER — EVOLOCUMAB 140 MG/ML
140 INJECTION, SOLUTION SUBCUTANEOUS
Qty: 6 | Refills: 1 | Status: DISCONTINUED | COMMUNITY
Start: 2019-12-20 | End: 2020-10-01

## 2020-10-01 NOTE — DISCUSSION/SUMMARY
[FreeTextEntry1] : \par  [Time Spent: ___ minutes] : I have spent [unfilled] minutes with the patient on the telephone

## 2020-10-01 NOTE — ASSESSMENT
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

## 2020-10-01 NOTE — HISTORY OF PRESENT ILLNESS
[FreeTextEntry1] : HPI\par \par Follow up for \par \par PreDM diet control \par \par Hyperthyroidism due to Graves diseae  on MMI  5mg qd\par \par \par Follow ups:\par \par Opthalmology\par  been overall stable \par saw opthalmology - had new lens  put in on right eye- vision is better \par \par just had steroid injeciton for bursitis  yesterday \par  LINNEA P alittle higher today \par \par some inc urination  +frequency - due to see urology  has been on fiansteride \par \par doig ok  wih activity \par  \par \par ROS No Neck pain No voice changes  No Chest pain  No Pressure  No palpitations No dyspnea   No n/v abd pain diarrhea or constipation  No LE edema \par \par \par \par

## 2020-10-01 NOTE — PHYSICAL EXAM
[Alert] : alert [Well Nourished] : well nourished [No Acute Distress] : no acute distress [Well Developed] : well developed [Normal Sclera/Conjunctiva] : normal sclera/conjunctiva [EOMI] : extra ocular movement intact [No Proptosis] : no proptosis [Normal Oropharynx] : the oropharynx was normal [Thyroid Not Enlarged] : the thyroid was not enlarged [No Thyroid Nodules] : no palpable thyroid nodules [No Respiratory Distress] : no respiratory distress [No Accessory Muscle Use] : no accessory muscle use [Clear to Auscultation] : lungs were clear to auscultation bilaterally [Normal S1, S2] : normal S1 and S2 [Normal Rate] : heart rate was normal [Regular Rhythm] : with a regular rhythm [No Edema] : no peripheral edema [Pedal Pulses Normal] : the pedal pulses are present [Normal Anterior Cervical Nodes] : no anterior cervical lymphadenopathy [Normal Posterior Cervical Nodes] : no posterior cervical lymphadenopathy [No Spinal Tenderness] : no spinal tenderness [Spine Straight] : spine straight [No Stigmata of Cushings Syndrome] : no stigmata of Cushings Syndrome [Normal Gait] : normal gait [Normal Strength/Tone] : muscle strength and tone were normal [No Rash] : no rash [Acanthosis Nigricans] : no acanthosis nigricans [Normal Reflexes] : deep tendon reflexes were 2+ and symmetric [No Tremors] : no tremors [Oriented x3] : oriented to person, place, and time

## 2020-10-26 ENCOUNTER — RX RENEWAL (OUTPATIENT)
Age: 70
End: 2020-10-26

## 2020-11-13 ENCOUNTER — NON-APPOINTMENT (OUTPATIENT)
Age: 70
End: 2020-11-13

## 2020-11-13 ENCOUNTER — APPOINTMENT (OUTPATIENT)
Dept: CARDIOLOGY | Facility: CLINIC | Age: 70
End: 2020-11-13
Payer: MEDICARE

## 2020-11-13 VITALS
WEIGHT: 226 LBS | OXYGEN SATURATION: 97 % | HEART RATE: 76 BPM | HEIGHT: 74 IN | BODY MASS INDEX: 29 KG/M2 | DIASTOLIC BLOOD PRESSURE: 83 MMHG | TEMPERATURE: 98.3 F | SYSTOLIC BLOOD PRESSURE: 128 MMHG

## 2020-11-13 DIAGNOSIS — E78.2 MIXED HYPERLIPIDEMIA: ICD-10-CM

## 2020-11-13 PROCEDURE — 93000 ELECTROCARDIOGRAM COMPLETE: CPT

## 2020-11-13 PROCEDURE — 99214 OFFICE O/P EST MOD 30 MIN: CPT

## 2020-11-13 NOTE — PHYSICAL EXAM
[General Appearance - Well Developed] : well developed [Normal Appearance] : normal appearance [Well Groomed] : well groomed [General Appearance - Well Nourished] : well nourished [General Appearance - In No Acute Distress] : no acute distress [Normal Conjunctiva] : the conjunctiva exhibited no abnormalities [No Oral Pallor] : no oral pallor [No Oral Cyanosis] : no oral cyanosis [Normal Jugular Venous V Waves Present] : normal jugular venous V waves present [No Jugular Venous Bueno A Waves] : no jugular venous bueno A waves [Respiration, Rhythm And Depth] : normal respiratory rhythm and effort [Exaggerated Use Of Accessory Muscles For Inspiration] : no accessory muscle use [Auscultation Breath Sounds / Voice Sounds] : lungs were clear to auscultation bilaterally [Heart Rate And Rhythm] : heart rate and rhythm were normal [Heart Sounds] : normal S1 and S2 [Edema] : no peripheral edema present [Systolic grade ___/6] : A grade [unfilled]/6 systolic murmur was heard. [Abdomen Soft] : soft [Abdomen Tenderness] : non-tender [Abnormal Walk] : normal gait [Nail Clubbing] : no clubbing of the fingernails [Cyanosis, Localized] : no localized cyanosis [Skin Turgor] : normal skin turgor [] : no rash [Oriented To Time, Place, And Person] : oriented to person, place, and time [Impaired Insight] : insight and judgment were intact [Affect] : the affect was normal [Memory Recent] : recent memory was not impaired

## 2020-11-22 NOTE — HISTORY OF PRESENT ILLNESS
[FreeTextEntry1] : 69 year old male with PMH of prediabetes, coronary disease S/P CABG in [place] in 2012, s/p multiple PCIs- last PCI 12/2019, HLD, HTN, and hyperthyroidism. Patient presents for routine follow up. He states he is feeling well. Denies CP, SOB, JURADO, edema, palpitations, near syncope or syncope. He exercises routinely, 1-1.5 hours 3x/week, denies chest pain or SOB. He also changed his diet and no longer eats red meat. TTE from 11/2019 LVEF 46%, mild-mod MR, and moderate AS, and apical inferior hypokinesis. Subsequent LHC in 12/2019 with LIMA to LAD dz s/p Omar MARCELINA x2. He reports compliance with his medications. Recent bloodwork reviewed, LDL 70, Hgb A1C 6.4. He admits to eating a lot of sweets recently.

## 2020-11-22 NOTE — DISCUSSION/SUMMARY
[FreeTextEntry1] : Discussed with and seen by Dr. Oviedo.\par \par Plan\par 1. CAD- h/o CABG/PCIs (last PCI 12/2019), on ASA, Plavix , crestor, and BB, no EKG changes or new symptoms, progressive CAD despite being on statin and h/o statin intolerance with higher doses, consider  adding Repatha after the new year with insurance change\par 2. HTN- BP at goal\par 3. HLD- LDL goal <60, on crestor 20 mg QD, LDL 70\par 4. continue diet and exercise\par 5. moderate AS and mildly reduced LVEF 45-50%- repeat echo in 6 months with follow up\par 6. f/u in 6 months with Dr. Oviedo or sooner if needed\par \par KONRAD Salazar\par \par Agree with above assessment and plan.  I personally evaluated and examined Cici who is doing well from the cardiac perspective with no evidence of symptoms of progressive AS.  Agree that better lipid lowering would be ideal and would start PCSK9 antagonists in the new year when his insurance changes.\par \par Edison Oviedo MD

## 2020-12-03 ENCOUNTER — APPOINTMENT (OUTPATIENT)
Dept: ENDOCRINOLOGY | Facility: CLINIC | Age: 70
End: 2020-12-03

## 2020-12-03 ENCOUNTER — APPOINTMENT (OUTPATIENT)
Dept: ENDOCRINOLOGY | Facility: CLINIC | Age: 70
End: 2020-12-03
Payer: MEDICARE

## 2020-12-03 PROCEDURE — 99072 ADDL SUPL MATRL&STAF TM PHE: CPT

## 2020-12-03 PROCEDURE — 76536 US EXAM OF HEAD AND NECK: CPT

## 2020-12-31 ENCOUNTER — APPOINTMENT (OUTPATIENT)
Dept: ENDOCRINOLOGY | Facility: CLINIC | Age: 70
End: 2020-12-31

## 2021-02-04 ENCOUNTER — APPOINTMENT (OUTPATIENT)
Dept: ENDOCRINOLOGY | Facility: CLINIC | Age: 71
End: 2021-02-04
Payer: MEDICARE

## 2021-02-04 VITALS
OXYGEN SATURATION: 97 % | DIASTOLIC BLOOD PRESSURE: 80 MMHG | HEIGHT: 74 IN | HEART RATE: 58 BPM | BODY MASS INDEX: 29.26 KG/M2 | SYSTOLIC BLOOD PRESSURE: 125 MMHG | WEIGHT: 228 LBS

## 2021-02-04 LAB
HBA1C MFR BLD HPLC: 5.9
LDLC SERPL DIRECT ASSAY-MCNC: 84
MICROALBUMIN/CREAT 24H UR-RTO: 6

## 2021-02-04 PROCEDURE — 99214 OFFICE O/P EST MOD 30 MIN: CPT

## 2021-02-04 NOTE — DISCUSSION/SUMMARY
[Time Spent: ___ minutes] : I have spent [unfilled] minutes with the patient on the telephone [FreeTextEntry1] : \par

## 2021-02-04 NOTE — ASSESSMENT
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 chronic goiut- no area of redness or inflammaiton - will  see PMD  \par \par low B12 cabn add MVI \par \par low VIt [place] OTC\par \par

## 2021-02-04 NOTE — PHYSICAL EXAM
[Alert] : alert [Well Nourished] : well nourished [No Acute Distress] : no acute distress [Well Developed] : well developed [Normal Sclera/Conjunctiva] : normal sclera/conjunctiva [EOMI] : extra ocular movement intact [No Proptosis] : no proptosis [Normal Oropharynx] : the oropharynx was normal [Thyroid Not Enlarged] : the thyroid was not enlarged [No Thyroid Nodules] : no palpable thyroid nodules [No Respiratory Distress] : no respiratory distress [No Accessory Muscle Use] : no accessory muscle use [Clear to Auscultation] : lungs were clear to auscultation bilaterally [Normal S1, S2] : normal S1 and S2 [Normal Rate] : heart rate was normal [Regular Rhythm] : with a regular rhythm [No Edema] : no peripheral edema [Pedal Pulses Normal] : the pedal pulses are present [Normal Anterior Cervical Nodes] : no anterior cervical lymphadenopathy [No Spinal Tenderness] : no spinal tenderness [Spine Straight] : spine straight [No Stigmata of Cushings Syndrome] : no stigmata of Cushings Syndrome [Normal Gait] : normal gait [Normal Strength/Tone] : muscle strength and tone were normal [No Rash] : no rash [Normal Reflexes] : deep tendon reflexes were 2+ and symmetric [No Tremors] : no tremors [Oriented x3] : oriented to person, place, and time [Acanthosis Nigricans] : no acanthosis nigricans [de-identified] : mild thyromegaly

## 2021-02-23 ENCOUNTER — INPATIENT (INPATIENT)
Facility: HOSPITAL | Age: 71
LOS: 0 days | Discharge: HOME | DRG: 287 | End: 2021-02-24
Attending: HOSPITALIST | Admitting: STUDENT IN AN ORGANIZED HEALTH CARE EDUCATION/TRAINING PROGRAM
Payer: MEDICARE

## 2021-02-23 ENCOUNTER — NON-APPOINTMENT (OUTPATIENT)
Age: 71
End: 2021-02-23

## 2021-02-23 VITALS
OXYGEN SATURATION: 98 % | HEART RATE: 76 BPM | HEIGHT: 72 IN | SYSTOLIC BLOOD PRESSURE: 133 MMHG | WEIGHT: 225.97 LBS | TEMPERATURE: 98 F | DIASTOLIC BLOOD PRESSURE: 78 MMHG | RESPIRATION RATE: 20 BRPM

## 2021-02-23 DIAGNOSIS — R07.9 CHEST PAIN, UNSPECIFIED: ICD-10-CM

## 2021-02-23 DIAGNOSIS — Z98.89 OTHER SPECIFIED POSTPROCEDURAL STATES: Chronic | ICD-10-CM

## 2021-02-23 DIAGNOSIS — Z95.5 PRESENCE OF CORONARY ANGIOPLASTY IMPLANT AND GRAFT: Chronic | ICD-10-CM

## 2021-02-23 DIAGNOSIS — Z95.1 PRESENCE OF AORTOCORONARY BYPASS GRAFT: Chronic | ICD-10-CM

## 2021-02-23 DIAGNOSIS — Z90.49 ACQUIRED ABSENCE OF OTHER SPECIFIED PARTS OF DIGESTIVE TRACT: Chronic | ICD-10-CM

## 2021-02-23 LAB
ALBUMIN SERPL ELPH-MCNC: 4.4 G/DL — SIGNIFICANT CHANGE UP (ref 3.3–5.2)
ALP SERPL-CCNC: 91 U/L — SIGNIFICANT CHANGE UP (ref 40–120)
ALT FLD-CCNC: 13 U/L — SIGNIFICANT CHANGE UP
ANION GAP SERPL CALC-SCNC: 11 MMOL/L — SIGNIFICANT CHANGE UP (ref 5–17)
APTT BLD: 33.9 SEC — SIGNIFICANT CHANGE UP (ref 27.5–35.5)
AST SERPL-CCNC: 15 U/L — SIGNIFICANT CHANGE UP
BASOPHILS # BLD AUTO: 0.03 K/UL — SIGNIFICANT CHANGE UP (ref 0–0.2)
BASOPHILS NFR BLD AUTO: 0.4 % — SIGNIFICANT CHANGE UP (ref 0–2)
BILIRUB SERPL-MCNC: 0.4 MG/DL — SIGNIFICANT CHANGE UP (ref 0.4–2)
BUN SERPL-MCNC: 20 MG/DL — SIGNIFICANT CHANGE UP (ref 8–20)
CALCIUM SERPL-MCNC: 9.3 MG/DL — SIGNIFICANT CHANGE UP (ref 8.6–10.2)
CHLORIDE SERPL-SCNC: 103 MMOL/L — SIGNIFICANT CHANGE UP (ref 98–107)
CO2 SERPL-SCNC: 27 MMOL/L — SIGNIFICANT CHANGE UP (ref 22–29)
CREAT SERPL-MCNC: 0.9 MG/DL — SIGNIFICANT CHANGE UP (ref 0.5–1.3)
EOSINOPHIL # BLD AUTO: 0.14 K/UL — SIGNIFICANT CHANGE UP (ref 0–0.5)
EOSINOPHIL NFR BLD AUTO: 2 % — SIGNIFICANT CHANGE UP (ref 0–6)
GLUCOSE SERPL-MCNC: 106 MG/DL — HIGH (ref 70–99)
HCT VFR BLD CALC: 44.1 % — SIGNIFICANT CHANGE UP (ref 39–50)
HGB BLD-MCNC: 14.3 G/DL — SIGNIFICANT CHANGE UP (ref 13–17)
IMM GRANULOCYTES NFR BLD AUTO: 0.4 % — SIGNIFICANT CHANGE UP (ref 0–1.5)
INR BLD: 1.13 RATIO — SIGNIFICANT CHANGE UP (ref 0.88–1.16)
LYMPHOCYTES # BLD AUTO: 0.96 K/UL — LOW (ref 1–3.3)
LYMPHOCYTES # BLD AUTO: 14 % — SIGNIFICANT CHANGE UP (ref 13–44)
MCHC RBC-ENTMCNC: 27.8 PG — SIGNIFICANT CHANGE UP (ref 27–34)
MCHC RBC-ENTMCNC: 32.4 GM/DL — SIGNIFICANT CHANGE UP (ref 32–36)
MCV RBC AUTO: 85.8 FL — SIGNIFICANT CHANGE UP (ref 80–100)
MONOCYTES # BLD AUTO: 0.42 K/UL — SIGNIFICANT CHANGE UP (ref 0–0.9)
MONOCYTES NFR BLD AUTO: 6.1 % — SIGNIFICANT CHANGE UP (ref 2–14)
NEUTROPHILS # BLD AUTO: 5.29 K/UL — SIGNIFICANT CHANGE UP (ref 1.8–7.4)
NEUTROPHILS NFR BLD AUTO: 77.1 % — HIGH (ref 43–77)
NT-PROBNP SERPL-SCNC: 84 PG/ML — SIGNIFICANT CHANGE UP (ref 0–300)
PLATELET # BLD AUTO: 269 K/UL — SIGNIFICANT CHANGE UP (ref 150–400)
POTASSIUM SERPL-MCNC: 4.2 MMOL/L — SIGNIFICANT CHANGE UP (ref 3.5–5.3)
POTASSIUM SERPL-SCNC: 4.2 MMOL/L — SIGNIFICANT CHANGE UP (ref 3.5–5.3)
PROT SERPL-MCNC: 7.3 G/DL — SIGNIFICANT CHANGE UP (ref 6.6–8.7)
PROTHROM AB SERPL-ACNC: 13 SEC — SIGNIFICANT CHANGE UP (ref 10.6–13.6)
RAPID RVP RESULT: SIGNIFICANT CHANGE UP
RBC # BLD: 5.14 M/UL — SIGNIFICANT CHANGE UP (ref 4.2–5.8)
RBC # FLD: 13.4 % — SIGNIFICANT CHANGE UP (ref 10.3–14.5)
SARS-COV-2 RNA SPEC QL NAA+PROBE: SIGNIFICANT CHANGE UP
SODIUM SERPL-SCNC: 141 MMOL/L — SIGNIFICANT CHANGE UP (ref 135–145)
TROPONIN T SERPL-MCNC: <0.01 NG/ML — SIGNIFICANT CHANGE UP (ref 0–0.06)
WBC # BLD: 6.87 K/UL — SIGNIFICANT CHANGE UP (ref 3.8–10.5)
WBC # FLD AUTO: 6.87 K/UL — SIGNIFICANT CHANGE UP (ref 3.8–10.5)

## 2021-02-23 PROCEDURE — 99236 HOSP IP/OBS SAME DATE HI 85: CPT

## 2021-02-23 PROCEDURE — 71046 X-RAY EXAM CHEST 2 VIEWS: CPT | Mod: 26

## 2021-02-23 PROCEDURE — 93306 TTE W/DOPPLER COMPLETE: CPT | Mod: 26

## 2021-02-23 PROCEDURE — 99223 1ST HOSP IP/OBS HIGH 75: CPT

## 2021-02-23 PROCEDURE — 93010 ELECTROCARDIOGRAM REPORT: CPT

## 2021-02-23 RX ORDER — CLOPIDOGREL BISULFATE 75 MG/1
75 TABLET, FILM COATED ORAL AT BEDTIME
Refills: 0 | Status: DISCONTINUED | OUTPATIENT
Start: 2021-02-23 | End: 2021-02-24

## 2021-02-23 RX ORDER — ASPIRIN/CALCIUM CARB/MAGNESIUM 324 MG
81 TABLET ORAL DAILY
Refills: 0 | Status: DISCONTINUED | OUTPATIENT
Start: 2021-02-23 | End: 2021-02-24

## 2021-02-23 RX ORDER — LOSARTAN POTASSIUM 100 MG/1
100 TABLET, FILM COATED ORAL DAILY
Refills: 0 | Status: DISCONTINUED | OUTPATIENT
Start: 2021-02-23 | End: 2021-02-24

## 2021-02-23 RX ORDER — ENOXAPARIN SODIUM 100 MG/ML
40 INJECTION SUBCUTANEOUS DAILY
Refills: 0 | Status: DISCONTINUED | OUTPATIENT
Start: 2021-02-23 | End: 2021-02-24

## 2021-02-23 RX ORDER — METOPROLOL TARTRATE 50 MG
25 TABLET ORAL AT BEDTIME
Refills: 0 | Status: DISCONTINUED | OUTPATIENT
Start: 2021-02-23 | End: 2021-02-24

## 2021-02-23 RX ORDER — ATORVASTATIN CALCIUM 80 MG/1
80 TABLET, FILM COATED ORAL AT BEDTIME
Refills: 0 | Status: DISCONTINUED | OUTPATIENT
Start: 2021-02-23 | End: 2021-02-24

## 2021-02-23 RX ORDER — FINASTERIDE 5 MG/1
2.5 TABLET, FILM COATED ORAL
Refills: 0 | Status: DISCONTINUED | OUTPATIENT
Start: 2021-02-23 | End: 2021-02-24

## 2021-02-23 RX ADMIN — ATORVASTATIN CALCIUM 80 MILLIGRAM(S): 80 TABLET, FILM COATED ORAL at 23:24

## 2021-02-23 RX ADMIN — CLOPIDOGREL BISULFATE 75 MILLIGRAM(S): 75 TABLET, FILM COATED ORAL at 23:24

## 2021-02-23 RX ADMIN — Medication 25 MILLIGRAM(S): at 23:24

## 2021-02-23 NOTE — H&P ADULT - ASSESSMENT
71 y/o male with PMHx of prediabetes, coronary disease S/P 4V CABG in [place] in 2013, s/p multiple PCIs (last PCI 12/2019 w/MARCELINA x2 to LAD), HLD, HTN, hyperthyroidism admitted with chest pain and HFrEF.    #Chest pain/CAD  - admit to telemetry  - troponin negative x 3  - EKG without acute ischemia  - TTE showed EF 35-40% (prior EF 46% in 2019 per cards)  - NPO past midnight for Nuclear stress test in AM +/- LHC  - continue DAPT, beta blocker, statin  - cardiology consulted, recs appreciated    #HFrEF  - patient with EF 35-40%, decreased from most recent ECHO in 2019  - patient appears clinically euvolemic and has a negative proBNP  - continue asa, statin, beta blocker, ARB  - f/u nuclear stress test in AM - may need LHC    #Prediabetes  - monitor blood glucose on chemistry  - check A1C in AM    #HTN  - continue toprol xl and losartan  - monitor hemodynamics    #Hyperthyroidism  - continue methimazole  - check TSH    #Elevated PSA  - continue finasteride 2.5mg TIW    #Need for prophylactic measure  - VTE ppx: lovenox subQ    IMPROVE VTE Individual Risk Assessment          RISK                                                          Points  [  ] Previous VTE                                                3  [  ] Thrombophilia                                             2  [  ] Lower limb paralysis                                   2        (unable to hold up >15 seconds)    [  ] Current Cancer                                             2         (within 6 months)  [  ] Immobilization > 24 hrs                              1  [  ] ICU/CCU stay > 24 hours                             1  [ x ] Age > 60                                                         1    IMPROVE VTE Score: 1     69 y/o male with PMHx of prediabetes, coronary disease S/P 4V CABG in [place] in 2013, s/p multiple PCIs (last PCI 12/2019 w/MARCELINA x2 to LAD), HLD, HTN, hyperthyroidism admitted with chest pain and HFrEF.    #Chest pain/CAD  - admit to telemetry  - troponin negative x 3  - EKG without acute ischemia  - TTE showed EF 35-40% (prior EF 46% in 2019 per cards)  - NPO past midnight for Nuclear stress test in AM +/- LHC  - continue DAPT, beta blocker, statin  - cardiology consulted, recs appreciated    #ICM/HFrEF  - patient with EF 35-40%, decreased from most recent ECHO in 2019  - patient appears clinically euvolemic and has a negative proBNP  - continue asa, statin, beta blocker, ARB  - f/u nuclear stress test in AM - may need LHC    #Prediabetes  - monitor blood glucose on chemistry  - check A1C in AM    #HTN  - continue toprol xl and losartan  - monitor hemodynamics    #Hyperthyroidism  - continue methimazole  - check TSH    #Elevated PSA  - continue finasteride 2.5mg TIW    #Need for prophylactic measure  - VTE ppx: lovenox subQ    IMPROVE VTE Individual Risk Assessment          RISK                                                          Points  [  ] Previous VTE                                                3  [  ] Thrombophilia                                             2  [  ] Lower limb paralysis                                   2        (unable to hold up >15 seconds)    [  ] Current Cancer                                             2         (within 6 months)  [  ] Immobilization > 24 hrs                              1  [  ] ICU/CCU stay > 24 hours                             1  [ x ] Age > 60                                                         1    IMPROVE VTE Score: 1

## 2021-02-23 NOTE — H&P ADULT - NSICDXPASTMEDICALHX_GEN_ALL_CORE_FT
PAST MEDICAL HISTORY:  Coronary artery disease     Hypercholesterolemia     Hypertension     Hyperthyroidism     Left eye trauma limited vision    Renal colic

## 2021-02-23 NOTE — ED ADULT TRIAGE NOTE - CHIEF COMPLAINT QUOTE
Pt arrives to ED c/o chest pressure 3/10 and L arm pain , pt states symptoms started after shoveling snow two weeks ago . Hx of cardiac stents. STAT EKG obtained

## 2021-02-23 NOTE — H&P ADULT - NSHPPHYSICALEXAM_GEN_ALL_CORE
T(F): 97.8 (23 Feb 2021 14:47), Max: 97.8 (23 Feb 2021 10:27)  HR: 75 (23 Feb 2021 14:47) (75 - 76)  BP: 140/75 (23 Feb 2021 14:47) (133/78 - 140/75)  RR: 20 (23 Feb 2021 14:47) (20 - 20)  SpO2: 98% (23 Feb 2021 14:47) (98% - 98%)    General: patient laying in bed in NAD    Physical Examination limited by telemedicine encounter, full physical exam from inpatient hospitalist to follow.

## 2021-02-23 NOTE — ED CDU PROVIDER INITIAL DAY NOTE - PHYSICAL EXAMINATION
Const: Awake, alert and oriented. In no acute distress. Well appearing.  HEENT: NC/AT. Moist mucous membranes.  Eyes: No scleral icterus. EOMI.  Neck:. Soft and supple. Full ROM without pain.  Cardiac: Regular rate and regular rhythm. +S1/S2. Peripheral pulses 2+ and symmetric. No LE edema.  Respiratory: Speaking in full sentences. No evidence of respiratory distress. No wheezes, rales or rhonchi.  Abd: Soft, non-tender, non-distended. Normal bowel sounds in all 4 quadrants. No guarding or rebound.  Back: Spine midline and non-tender. No CVAT.  Skin: No rashes, abrasions or lacerations.  Lymph: No cervical lymphadenopathy.  Neuro: Awake, alert & oriented x 3. Moves all extremities symmetrically.

## 2021-02-23 NOTE — ED PROVIDER NOTE - PHYSICAL EXAMINATION
Const: Awake, alert and oriented. In no acute distress. Well appearing.  HEENT: NC/AT. Moist mucous membranes.  Eyes: No scleral icterus. EOMI.  Neck:. Soft and supple. Full ROM without pain.  Cardiac: Regular rate and regular rhythm. +S1/S2. Peripheral pulses 2+ and symmetric. No LE edema.  Resp: Speaking in full sentences. No evidence of respiratory distress. No wheezes, rales or rhonchi.  Abd: Soft, non-tender, non-distended. Normal bowel sounds in all 4 quadrants. No guarding or rebound.  Back: Spine midline and non-tender. No CVAT.  Skin: No rashes, abrasions or lacerations.  Lymph: No cervical lymphadenopathy.  Neuro: Awake, alert & oriented x 3. Moves all extremities symmetrically. Const: Awake, alert and oriented. In no acute distress. Well appearing.  HEENT: NC/AT. Moist mucous membranes.  Eyes: No scleral icterus. EOMI.  Neck:. Soft and supple. Full ROM without pain.  Cardiac: Regular rate and regular rhythm. +S1/S2. Peripheral pulses 2+ and symmetric. No LE edema.  Respiratory: Speaking in full sentences. No evidence of respiratory distress. No wheezes, rales or rhonchi.  Abd: Soft, non-tender, non-distended. Normal bowel sounds in all 4 quadrants. No guarding or rebound.  Back: Spine midline and non-tender. No CVAT.  Skin: No rashes, abrasions or lacerations.  Lymph: No cervical lymphadenopathy.  Neuro: Awake, alert & oriented x 3. Moves all extremities symmetrically.

## 2021-02-23 NOTE — CONSULT NOTE ADULT - SUBJECTIVE AND OBJECTIVE BOX
[place] [facility]                                                               Office:  [address] [address]                                                              Telephone: [phone]. Fax:[phone]                                                                        CARDIOLOGY CONSULTATION NOTE                                                                                             Consult requested by:    Reason for Consultation:   History obtained by: Patient and medical record   obtained: No    Chief complaint:    Patient is a 70y old  Male who presents with a chief complaint of     HPI:      REVIEW OF SYMPTOMS:     CONSTITUTIONAL: No fever, weight loss, or fatigue  ENMT:  No difficulty hearing, tinnitus, vertigo; No sinus or throat pain  NECK: No pain or stiffness  CARDIOVASCULAR: No chest pain, dyspnea, syncope, palpitations, dizziness, Orthopnea, Paroxsymal nocturnal dyspnea  RESPIRATORY: No Dyspnea on exertion, Shortness of breath, cough, wheezing  : No dysuria, no hematuria   GI: No dark color stool, no melena, no diarrhea, no constipation, no abdominal pain   NEURO: No headache, no dizziness, no slurred speech   MUSCULOSKELETAL: No joint pain or swelling; No muscle, back, or extremity pain  PSYCH: No agitation, no anxiety.    ALL OTHER REVIEW OF SYSTEMS ARE NEGATIVE.      PREVIOUS DIAGNOSTIC TESTING  ECHO FINDINGS:      STRESS FINDINGS:      CATHETERIZATION FINDINGS:         ALLERGIES: Allergies    No Known Allergies    Intolerances          PAST MEDICAL HISTORY  Left eye trauma    Renal colic    Hypertension    Hypercholesterolemia    Coronary artery disease    Hyperthyroidism        PAST SURGICAL HISTORY  S/P cholecystectomy    S/P drug eluting coronary stent placement    S/P eye surgery    S/P appendectomy    H/O coronary artery bypass surgery        FAMILY HISTORY:  Family history of coronary artery disease (Sibling)    Family history of acute myocardial infarction    Family history of coronary artery disease        SOCIAL HISTORY:    CIGARETTES: Denies  ALCOHOL: Denies  DRUGS: Denies      CURRENT MEDICATIONS:           HOME MEDICATIONS:      Vital Signs Last 24 Hrs  T(C): 36.6 (23 Feb 2021 10:27), Max: 36.6 (23 Feb 2021 10:27)  T(F): 97.8 (23 Feb 2021 10:27), Max: 97.8 (23 Feb 2021 10:27)  HR: 76 (23 Feb 2021 10:27) (76 - 76)  BP: 133/78 (23 Feb 2021 10:27) (133/78 - 133/78)  BP(mean): --  RR: 20 (23 Feb 2021 10:27) (20 - 20)  SpO2: 98% (23 Feb 2021 10:27) (98% - 98%)      PHYSICAL EXAM:  Appearance: NAD, well nourished	  Neurologic: A&Ox3, PERRL, EOMI, Grossly non-focal with full strength in all four extremities  HEENT:   Normal oral mucosa, sclera non-icteric	  Lymphatic: No cervical lymphadenopathy  Cardiovascular: Normal S1 S2, No JVD, No murmur, No carotid bruits  Respiratory: Lungs clear to auscultation	  Psychiatry: Mood & affect appropriate  Gastrointestinal:  Soft, Non-tender, + BS, no bruits	  Extremities: Normal range of motion, No clubbing, cyanosis or edema  Vascular: Peripheral pulses palpable 2+ bilaterally  Skin: No Erythema, No ecchymoses, No cyanosis, No rashes  Lines and Tubes: n/a    Intake and output:     LABS:                        14.3   6.87  )-----------( 269      ( 23 Feb 2021 11:31 )             44.1     02-23    141  |  103  |  20.0  ----------------------------<  106<H>  4.2   |  27.0  |  0.90    Ca    9.3      23 Feb 2021 11:31    TPro  7.3  /  Alb  4.4  /  TBili  0.4  /  DBili  x   /  AST  15  /  ALT  13  /  AlkPhos  91  02-23    CARDIAC MARKERS ( 23 Feb 2021 11:31 )  x     / <0.01 ng/mL / x     / x     / x        ;p-BNP=  PT/INR - ( 23 Feb 2021 11:31 )   PT: 13.0 sec;   INR: 1.13 ratio         PTT - ( 23 Feb 2021 11:31 )  PTT:33.9 sec      INTERPRETATION OF TELEMETRY: Reviewed by me.   ECG: Reviewed by me.     RADIOLOGY & ADDITIONAL STUDIES:    X-ray:  reviewed by me.     CT scan:   MRI:                                                                          [place] [facility]                                                               Office:  [address] [address]                                                              Telephone: [phone]. Fax:[phone]                                                                        CARDIOLOGY CONSULTATION NOTE                                                                                             Consult requested by:  Dr. Rodriguez  Reason for Consultation: Chest Pain  History obtained by: Patient and medical record   obtained: No    Chief complaint:    Patient is a 70y old  Male who presents with a chief complaint of left chest pain radiating down left arm    HPI:  69yo male w/PMH prediabetes, coronary disease S/P CABG in [place] in , s/p multiple PCIs- last PCI 2019 w/MARCELINA x2, HLD, HTN, and hyperthyroidism. Patient presents to ED c/o constant dull left chest pressure 2/10 w/intermittent increase in discomfort (that resolves on its own) and radiation down left arm for the past several days. Pain is non reproducible w/o aggravating or modifying factors. He states that last week he thinks he overexerted himself shoveling snow, and has been under increased stress regarding caring of his parents and reminiscing about his son that passed away 8yrs ago. For the last week he has been waking up in the middle of the night anxious, diaphoretic and tearful. He normally exercises routinely, 1-1.5 hours 3x/week,  and denies chest pain or SOB during, though he has not been as active over the last month or so. He follows closely w/cardiologist Dr. Oviedo. Per LOV  "TTE from 2019 LVEF 46%, mild-mod MR, and moderate AS, and apical inferior hypokinesis. Subsequent LHC in 2019 with LIMA to LAD dz s/p [place] AMRCELINA x2. He reports compliance with his medications. Recent bloodwork reviewed, LDL 70, Hgb A1C 6.4.". Currently denies palpitations, irregular and/or rapid heart beat, SOB, JURADO, syncope/near syncope, dizziness, orthopnea, PND, cough, edema, f/c, n/v/d, hematuria, or hematochezia.     REVIEW OF SYMPTOMS:     CONSTITUTIONAL: No fever, weight loss, or fatigue  ENMT:  No difficulty hearing, tinnitus, vertigo; No sinus or throat pain  NECK: No pain or stiffness  CARDIOVASCULAR: as per HPI  RESPIRATORY: No Dyspnea on exertion, Shortness of breath, cough, wheezing  : No dysuria, no hematuria   GI: No dark color stool, no melena, no diarrhea, no constipation, no abdominal pain   NEURO: No headache, no dizziness, no slurred speech   MUSCULOSKELETAL: No joint pain or swelling; No muscle, back, or extremity pain  PSYCH: as per HPI  ALL OTHER REVIEW OF SYSTEMS ARE NEGATIVE.      PREVIOUS DIAGNOSTIC TESTING  EK2020, Sinus Rhythm with frequent PACs-Nonspecific ST depression + Diffuse nonspecific T-abnormality -Nondiagnostic.   Cardiac Cath: 2019, MARCELINA x2 Resolute LAUREN 2.75x12 to distal LIMA LAD and 2.5x18 to dLAD native afterLIMA 80%   Stent: 2019, MARCELINA x2 Resolute LAUREN 2.75x12 to distal LIMA LAD and 2.5x18 to dLAD native afterLIMA 80%   TTE: 2019 moderate AS and mildly reduced LVEF 45-50%        ALLERGIES: Allergies    No Known Allergies    Intolerances          PAST MEDICAL HISTORY  Left eye trauma    Renal colic    Hypertension    Hypercholesterolemia    Coronary artery disease    Hyperthyroidism        PAST SURGICAL HISTORY  S/P cholecystectomy    S/P drug eluting coronary stent placement    S/P eye surgery    S/P appendectomy    H/O coronary artery bypass surgery        FAMILY HISTORY:  Family history of coronary artery disease (Sibling)    Family history of acute myocardial infarction    Family history of coronary artery disease        SOCIAL HISTORY:    CIGARETTES: Denies  ALCOHOL: Denies  DRUGS: Denies      CURRENT MEDICATIONS:           HOME MEDICATIONS:  Teresa Low Dose 81 MG Oral Tablet Delayed Release; TAKE 1 TABLET DAILY AS  DIRECTED  Clopidogrel Bisulfate 75 MG Oral Tablet; TAKE 1 TABLET BY MOUTH EVERY DAY  Losartan Potassium 100 MG Oral Tablet; TAKE 1 TABLET BY MOUTH ONCE DAILY  methIMAzole 5 MG Oral Tablet; TAKE 1 TABLET BY MOUTH EVERY DAY  Metoprolol Succinate ER 50 MG Oral Tablet Extended Release 24 Hour; TAKE 1/2  TABLET DAILY  Mupirocin Calcium 2 % External Cream; APPLY AND GENTLY MASSAGE INTO  AFFECTED AREA(S) TWICE DAILY  Proscar 5 MG Oral Tablet; 0.5/ half tablets by mouth 3 times weekly , 3 TIMES WEEKLY  Rosuvastatin Calcium 20 MG Oral Tablet; TAKE 1 TABLET DAILY  Ventolin  (90 Base) MCG/ACT Inhalation Aerosol Solution; INHALE 1 TO 2 PUFFS  EVERY 4 TO 6 HOURS AS NEEDED      Vital Signs Last 24 Hrs  T(C): 36.6 (2021 10:27), Max: 36.6 (2021 10:27)  T(F): 97.8 (2021 10:27), Max: 97.8 (2021 10:27)  HR: 76 (2021 10:27) (76 - 76)  BP: 133/78 (2021 10:27) (133/78 - 133/78)  BP(mean): --  RR: 20 (2021 10:27) (20 - 20)  SpO2: 98% (2021 10:27) (98% - 98%)      PHYSICAL EXAM:  Appearance: NAD, well nourished	  Neurologic: A&Ox3, PERRL, EOMI, Grossly non-focal with full strength in all four extremities  HEENT:   Normal oral mucosa, sclera non-icteric	  Lymphatic: No cervical lymphadenopathy  Cardiovascular: Normal S1 S2, No JVD, No murmur, No carotid bruits  Respiratory: Lungs clear to auscultation	  Psychiatry: Tearful  Gastrointestinal:  Soft, Non-tender, + BS, no bruits	  Extremities: Normal range of motion, No clubbing, cyanosis or edema  Vascular: Peripheral pulses palpable 2+ bilaterally  Skin: No Erythema, No ecchymoses, No cyanosis, No rashes  Lines and Tubes: n/a    Intake and output:     LABS:                        14.3   6.87  )-----------( 269      ( 2021 11:31 )             44.1     02-23    141  |  103  |  20.0  ----------------------------<  106<H>  4.2   |  27.0  |  0.90    Ca    9.3      2021 11:31    TPro  7.3  /  Alb  4.4  /  TBili  0.4  /  DBili  x   /  AST  15  /  ALT  13  /  AlkPhos  91  0223    CARDIAC MARKERS ( 2021 11:31 )  x     / <0.01 ng/mL / x     / x     / x        ;p-BNP=  PT/INR - ( 2021 11:31 )   PT: 13.0 sec;   INR: 1.13 ratio         PTT - ( 2021 11:31 )  PTT:33.9 sec      INTERPRETATION OF TELEMETRY: SR few PACs  ECG: SR @75bpm w/PAC bigeminy. NSST-T wave abnormality    RADIOLOGY & ADDITIONAL STUDIES:    X-ray:    pending    CT scan:   MRI:

## 2021-02-23 NOTE — H&P ADULT - NSHPSOCIALHISTORY_GEN_ALL_CORE
, lives at home  Former smoker, quit when he was 24 years old  Rare EtOH use  Denies illicit drug use

## 2021-02-23 NOTE — ED CDU PROVIDER DISPOSITION NOTE - ATTENDING CONTRIBUTION TO CARE
69yoM; with pmh signif for CAD (s/p 4V-CABG, stentx5), HTN, HLD; now p/w chest pain--worse x3 days, found to have worsening EF on echo and moderate AS. will admit for likely LHC in AM.

## 2021-02-23 NOTE — ED CDU PROVIDER INITIAL DAY NOTE - MEDICAL DECISION MAKING DETAILS
68 y/o m with intermittent chest pain x 2 weeks and worsened last 3 days. Seen by cardiology. Echo, stress, serial troponins. NPO after midnight

## 2021-02-23 NOTE — ED PROVIDER NOTE - CLINICAL SUMMARY MEDICAL DECISION MAKING FREE TEXT BOX
71 yo male history multiple cardiac stents presents with chest pain over past two weeks. Cardiology on board. Will obtain cardiac workup, serial troponins. Place patient in observation for echo and angiogram.

## 2021-02-23 NOTE — ED PROVIDER NOTE - OBJECTIVE STATEMENT
69 yo male history of triple CABG (2012), CAD, HTN presents with chest pain over the past two weeks. Patient states that his chest pain began two weeks ago while shoveling snow. The pain is pressure like in nature, located on the left side of his chest. The pain occasionally radiates down his left arm. The patient states that the pain seems to come on for roughly 10 minutes at a time and is pressure-like in nature. He does occasionally have some mild SOB with this chest pain. The patient states that he has been stressed recently with the health of his parents and has woken up during the night with sweats/chills. Patient currently denies abdominal pain, nausea, fever, diarrhea, chills, cough, dysuria, hematuria, LOC. 70 year old male history of triple CABG (2012), CAD, HTN presents with chest pain over the past two weeks. Patient states that his chest pain began two weeks ago while shoveling snow. The pain is pressure like in nature, located on the left side of his chest. The pain occasionally radiates down his left arm. The patient states that the pain seems to come on for roughly 10 minutes at a time and is pressure-like in nature. He does occasionally have some mild SOB with this chest pain. The patient states that he has been stressed recently with the health of his parents and has woken up during the night with sweats/chills. Patient currently denies abdominal pain, nausea, fever, diarrhea, chills, cough, dysuria, hematuria, LOC.

## 2021-02-23 NOTE — H&P ADULT - NSHPREVIEWOFSYSTEMS_GEN_ALL_CORE
General: no fever, chills  Eyes: no vision changes  ENT: no hearing changes, nasal congestion, or sore throat  CV: no palpitations; admits chest pressure with associated left arm pain  Pulm: no SOB, wheezing, cough, or hemoptysis  Abd/GI: no nausea, vomiting, diarrhea, constipation, abd pain  : no dysuria, hematuria, urinary frequency  MSK: no joint pain; admits muscle pain in left bicep  Neuro: no syncope, dizziness, focal weakness  Psych: no anxiety or depression  Endo: no heat or cold intolerance

## 2021-02-23 NOTE — ED ADULT NURSE NOTE - OBJECTIVE STATEMENT
Assumed care at 1104 pt co chest pain radiating to left arm after shovelling Assumed care at 1104 pt co chest pain radiating to left arm after shovelling, began about a week and half ago while shoveling, initial pain level was 6 currently a 2

## 2021-02-23 NOTE — ED CDU PROVIDER DISPOSITION NOTE - CLINICAL COURSE
68 y/o with pmhx of CABGx4, 5 coronary stents and hyperthyroidism c/o chest pain x 2 weeks which worsened over last 3 days. Troponin x 2 negative. Echo shows decrease in EF to 35-40%. Cardiology NP updated on findings. Pt to be admitted,  possible cath tomorrow. Pending nuclear stress tomorrow and additional troponin. Spoke with Dr Segura pt to be put on telemetry. Covid not detected.

## 2021-02-23 NOTE — H&P ADULT - HISTORY OF PRESENT ILLNESS
71 y/o male with PMHx of prediabetes, coronary disease S/P 4V CABG in [place] in 2013, s/p multiple PCIs (last PCI 12/2019 w/MARCELINA x2 to LAD), HLD, HTN, hyperthyroidism presented to the ED with pain in his left arm associated chest pain. States that his chest pressure sensation came on again 2 days ago, "like a ring around his heart." Initially 2 weeks ago while shoveling, he first experienced these symptoms - first attributing the arm pain to shoveling itself. He went for blood work and exam by his PMD subsequently, with no abnormalities. However, when he started experiencing pain again 2 days ago, his blood pressure was elevated at home and he came to the ED for evaluation. Currently still with left chest pressure 3/10 in duration. Still with some pressure under his left axilla and on his bicep muscles. Denies palpitations, dyspnea, headache, dizziness, abdominal pain, n/v/d.     In the ED,  Vital Signs T(F): Max: 97.8 HR: 75 BP: 140/75 RR: 20 SpO2: 98% on RA  Labs unremarkable, troponin T neg x 3, proBNP 84  CXR no acute infiltrates, improvement in atelectases  EKG SR with PACs at 72bpm, nonspecific tw abn (reviewed on alpha)  TTE: showed EF 35-40% (unchanged when compared to 2016 study, however EF was 46% in 2019) 69 y/o male with PMHx of prediabetes, coronary disease S/P 4V CABG in [place] in 2013, s/p multiple PCIs (last PCI 12/2019 w/MARCELINA x2 to LAD), HLD, HTN, hyperthyroidism presented to the ED with pain in his left arm associated chest pain. States that his chest pressure sensation came on again 2 days ago, "like a ring around his heart." Initially 2 weeks ago while shoveling, he first experienced these symptoms - first attributing the arm pain to shoveling itself. He went for blood work and exam by his PMD subsequently, with no abnormalities. However, when he started experiencing pain again 2 days ago, his blood pressure was elevated at home and he came to the ED for evaluation. Currently still with left chest pressure 3/10 in duration. Still with some pressure under his left axilla and on his left bicep muscle. Denies palpitations, dyspnea, headache, dizziness, abdominal pain, n/v/d. States that the last time he had a negative nuclear stress test, he still required cardiac catheterization with stent placement.    In the ED,  Vital Signs T(F): Max: 97.8 HR: 75 BP: 140/75 RR: 20 SpO2: 98% on RA  Labs unremarkable, troponin T neg x 3, proBNP 84  CXR no acute infiltrates, improvement in atelectases  EKG SR with PACs at 72bpm, nonspecific tw abn (reviewed on alpha)  TTE: showed EF 35-40% (EF was 40-45% in 2016 and reportedly 46% in 2019)

## 2021-02-23 NOTE — ED ADULT NURSE REASSESSMENT NOTE - NS ED NURSE REASSESS COMMENT FT1
Report given to Lexii CDU nurse. Patient vitals WDL. No s/s of distress at this time. Patient remains SR on monitor. Transferring to CDU.
pt in no apparent distress, back from echo, denies nay pain at the moment, plan of care explained, pt verbalized understanding. Meal provided.
Received patient @7pm. AX4. SR on monitor.,  on room air 96%. Patient is stable at this time with no s/s of distress or discomfort, vital signs WDL. NPO after midnight maintained. Patient resting comfortably at this time. Call bell within reach, will continue to monitor further.

## 2021-02-23 NOTE — H&P ADULT - ATTENDING COMMENTS
On site hospitalist.    Case discussed with tele-hospitalist.  Pt seen and examined with physical exam as documented below.  Agree with above.  Briefly, 70yoM hx CAD s/p multiple PCI, CABG,  HTN, hyperthyroidism presenting with chest pain.  Pt with     69 y/o male with PMHx of prediabetes, coronary disease S/P 4V CABG in [place] in 2013, s/p multiple PCIs (last PCI 12/2019 w/MARCELINA x2 to LAD), HLD, HTN, hyperthyroidism presented to the ED with pain in his left arm associated chest pain.    GENERAL:  Well-appearing, not in acute distress  EYES:  Clear conjunctiva, extraocular movement intact  ENT: Moist mucous membranes  RESP:  Non-labored breathing pattern, lungs clear to ausculation  CV: Regular rate and rhythm, no murmurs appreciated, no lower extremity edema  GI: Soft, non-tender, non-distended  NEURO: Awake, alert, conversant, upper and lower extremity strength 5/5, light touch sensation grossly intact  PSYCH: Calm, cooperative  SKIN: No rash or lesions, warm and dry On site hospitalist addendum     Case discussed with tele-hospitalist.  Pt seen and examined with physical exam as documented below.  Agree with above.  Briefly, 70yoM hx CAD s/p multiple PCI, CABG,  HTN, hyperthyroidism presenting with chest pain being admitted for ACS work up.  Pt with multiple negative troponin and EKG show NSR, HR 72, no ST-T changes.  Pt seen by cardiology who has tentative plan for stress test and pending results may have cardiac cath.    GENERAL:  Well-appearing, not in acute distress  EYES:  Clear conjunctiva, extraocular movement intact  ENT: Moist mucous membranes  RESP:  Non-labored breathing pattern, lungs clear to ausculation  CV: Regular rate and rhythm, no murmurs appreciated, no lower extremity edema  GI: Soft, non-tender, non-distended  NEURO: Awake, alert, conversant, upper and lower extremity strength 5/5, light touch sensation grossly intact  PSYCH: Calm, cooperative  SKIN: No rash or lesions, warm and dry On site hospitalist addendum     Case discussed with tele-hospitalist.  Pt seen and examined with physical exam as documented below.  Agree with above.  Briefly, 70yoM hx CAD s/p multiple PCI, CABG,  HTN, hyperthyroidism presenting with chest pain being admitted for ACS work up.  Pt with multiple negative troponin and EKG show NSR, HR 72, no ST-T changes.  Pt seen by cardiology who has tentative plan for stress test and pending results may have cardiac cath.    Vital Signs Last 24 Hrs  T(C): 36.7 (24 Feb 2021 04:29), Max: 36.8 (23 Feb 2021 23:20)  T(F): 98 (24 Feb 2021 04:29), Max: 98.2 (23 Feb 2021 23:20)  HR: 65 (24 Feb 2021 04:29) (60 - 76)  BP: 138/78 (24 Feb 2021 04:29) (133/78 - 140/75)  BP(mean): --  RR: 18 (23 Feb 2021 23:20) (18 - 20)  SpO2: 93% (24 Feb 2021 04:29) (93% - 98%)    GENERAL:  Well-appearing, not in acute distress  EYES:  Clear conjunctiva, extraocular movement intact  ENT: Moist mucous membranes  RESP:  Non-labored breathing pattern, lungs clear to ausculation  CV: Regular rate and rhythm, no murmurs appreciated, no lower extremity edema  GI: Soft, non-tender, non-distended  NEURO: Awake, alert, conversant, upper and lower extremity strength 5/5, light touch sensation grossly intact  PSYCH: Calm, cooperative  SKIN: No rash or lesions, warm and dry On site hospitalist addendum     Case discussed with tele-hospitalist.  Pt seen and examined with physical exam as documented below.  Agree with above.  Briefly, 70yoM hx CAD s/p multiple PCI, CABG,  HTN, hyperthyroidism presenting with chest pain being admitted for ACS work up.  Pt with multiple negative troponin and EKG show NSR, HR 72, no ST-T changes.  Pt initially accepted to ED observation, then medicine called for admission and TTE showed EF 35-40% which was a change from outpatient TTE performed in 2019 (not available in our EMR) that showed EF of 46%. Pt seen by cardiology who has tentative plan for stress test and pending results may have cardiac cath.    Vital Signs Last 24 Hrs  T(C): 36.7 (24 Feb 2021 04:29), Max: 36.8 (23 Feb 2021 23:20)  T(F): 98 (24 Feb 2021 04:29), Max: 98.2 (23 Feb 2021 23:20)  HR: 65 (24 Feb 2021 04:29) (60 - 76)  BP: 138/78 (24 Feb 2021 04:29) (133/78 - 140/75)  BP(mean): --  RR: 18 (23 Feb 2021 23:20) (18 - 20)  SpO2: 93% (24 Feb 2021 04:29) (93% - 98%)    GENERAL:  Well-appearing, not in acute distress  EYES:  Clear conjunctiva, extraocular movement intact  ENT: Moist mucous membranes  RESP:  Non-labored breathing pattern, lungs clear to ausculation  CV: Regular rate and rhythm, no murmurs appreciated, no lower extremity edema  GI: Soft, non-tender, non-distended  NEURO: Awake, alert, conversant, upper and lower extremity strength 5/5, light touch sensation grossly intact  PSYCH: Calm, cooperative  SKIN: No rash or lesions, warm and dry

## 2021-02-23 NOTE — ED ADULT NURSE NOTE - ED STAT RN HANDOFF TIME
Burning  And pressure when urinating since Fri. Asking to have something called in.   Pharm Nola/Manny blanchard
Need seen and urine sent
appt made
16:25

## 2021-02-23 NOTE — ED CDU PROVIDER INITIAL DAY NOTE - OBJECTIVE STATEMENT
70 year old M with pmhx of CABGx4 (2012), CAD, HTN and hyperthyroidism presents with chest pain over the past two weeks which began while shoveling snow. The pain is pressure like in nature, located on the left side of his chest. The pain occasionally radiates down his left arm. The patient states that the pain seems to come on for roughly 10 minutes at a time and is pressure-like in nature. He does occasionally have some mild SOB with this chest pain and has become more constant over last 3 days. The patient states that he has been stressed recently with the health of his parents and has woken up during the night with sweats/chills. Patient currently denies abdominal pain, nausea, fever, diarrhea, chills, cough, dysuria, hematuria, LOC.

## 2021-02-23 NOTE — ED CDU PROVIDER INITIAL DAY NOTE - ATTENDING CONTRIBUTION TO CARE
I, Harry Tran, performed a face to face bedside interview with this patient regarding history of present illness, review of symptoms and relevant past medical, social and family history.  I completed an independent physical examination. I have communicated the patient’s plan of care and disposition with the ACP.  70 year old male with PMH CAD s/p CABG presents with 2 weeks of intermittent L sided chest pain. Placed on Obs for stress test in AM  Gen: NAD, well appearing  CV: RRR  Pul: CTA b/l  Abd: Soft, non-distended, non-tender  Neuro: no focal deficits

## 2021-02-24 ENCOUNTER — TRANSCRIPTION ENCOUNTER (OUTPATIENT)
Age: 71
End: 2021-02-24

## 2021-02-24 VITALS
SYSTOLIC BLOOD PRESSURE: 143 MMHG | HEART RATE: 70 BPM | DIASTOLIC BLOOD PRESSURE: 74 MMHG | OXYGEN SATURATION: 96 % | RESPIRATION RATE: 18 BRPM

## 2021-02-24 LAB
A1C WITH ESTIMATED AVERAGE GLUCOSE RESULT: 6 % — HIGH (ref 4–5.6)
ALBUMIN SERPL ELPH-MCNC: 4.1 G/DL — SIGNIFICANT CHANGE UP (ref 3.3–5.2)
ALP SERPL-CCNC: 88 U/L — SIGNIFICANT CHANGE UP (ref 40–120)
ALT FLD-CCNC: 13 U/L — SIGNIFICANT CHANGE UP
ANION GAP SERPL CALC-SCNC: 11 MMOL/L — SIGNIFICANT CHANGE UP (ref 5–17)
AST SERPL-CCNC: 12 U/L — SIGNIFICANT CHANGE UP
BASOPHILS # BLD AUTO: 0.04 K/UL — SIGNIFICANT CHANGE UP (ref 0–0.2)
BASOPHILS NFR BLD AUTO: 0.5 % — SIGNIFICANT CHANGE UP (ref 0–2)
BILIRUB SERPL-MCNC: 0.5 MG/DL — SIGNIFICANT CHANGE UP (ref 0.4–2)
BUN SERPL-MCNC: 24 MG/DL — HIGH (ref 8–20)
CALCIUM SERPL-MCNC: 9.1 MG/DL — SIGNIFICANT CHANGE UP (ref 8.6–10.2)
CHLORIDE SERPL-SCNC: 102 MMOL/L — SIGNIFICANT CHANGE UP (ref 98–107)
CO2 SERPL-SCNC: 28 MMOL/L — SIGNIFICANT CHANGE UP (ref 22–29)
CREAT SERPL-MCNC: 0.9 MG/DL — SIGNIFICANT CHANGE UP (ref 0.5–1.3)
EOSINOPHIL # BLD AUTO: 0.21 K/UL — SIGNIFICANT CHANGE UP (ref 0–0.5)
EOSINOPHIL NFR BLD AUTO: 2.7 % — SIGNIFICANT CHANGE UP (ref 0–6)
ESTIMATED AVERAGE GLUCOSE: 126 MG/DL — HIGH (ref 68–114)
GLUCOSE SERPL-MCNC: 112 MG/DL — HIGH (ref 70–99)
HCT VFR BLD CALC: 43.2 % — SIGNIFICANT CHANGE UP (ref 39–50)
HCV AB S/CO SERPL IA: 0.08 S/CO — SIGNIFICANT CHANGE UP (ref 0–0.99)
HCV AB SERPL-IMP: SIGNIFICANT CHANGE UP
HGB BLD-MCNC: 13.9 G/DL — SIGNIFICANT CHANGE UP (ref 13–17)
IMM GRANULOCYTES NFR BLD AUTO: 0.3 % — SIGNIFICANT CHANGE UP (ref 0–1.5)
LYMPHOCYTES # BLD AUTO: 1.24 K/UL — SIGNIFICANT CHANGE UP (ref 1–3.3)
LYMPHOCYTES # BLD AUTO: 16.1 % — SIGNIFICANT CHANGE UP (ref 13–44)
MAGNESIUM SERPL-MCNC: 2.1 MG/DL — SIGNIFICANT CHANGE UP (ref 1.8–2.6)
MCHC RBC-ENTMCNC: 27.9 PG — SIGNIFICANT CHANGE UP (ref 27–34)
MCHC RBC-ENTMCNC: 32.2 GM/DL — SIGNIFICANT CHANGE UP (ref 32–36)
MCV RBC AUTO: 86.7 FL — SIGNIFICANT CHANGE UP (ref 80–100)
MONOCYTES # BLD AUTO: 0.6 K/UL — SIGNIFICANT CHANGE UP (ref 0–0.9)
MONOCYTES NFR BLD AUTO: 7.8 % — SIGNIFICANT CHANGE UP (ref 2–14)
NEUTROPHILS # BLD AUTO: 5.6 K/UL — SIGNIFICANT CHANGE UP (ref 1.8–7.4)
NEUTROPHILS NFR BLD AUTO: 72.6 % — SIGNIFICANT CHANGE UP (ref 43–77)
PHOSPHATE SERPL-MCNC: 3.3 MG/DL — SIGNIFICANT CHANGE UP (ref 2.4–4.7)
PLATELET # BLD AUTO: 263 K/UL — SIGNIFICANT CHANGE UP (ref 150–400)
POTASSIUM SERPL-MCNC: 4.3 MMOL/L — SIGNIFICANT CHANGE UP (ref 3.5–5.3)
POTASSIUM SERPL-SCNC: 4.3 MMOL/L — SIGNIFICANT CHANGE UP (ref 3.5–5.3)
PROT SERPL-MCNC: 6.7 G/DL — SIGNIFICANT CHANGE UP (ref 6.6–8.7)
RBC # BLD: 4.98 M/UL — SIGNIFICANT CHANGE UP (ref 4.2–5.8)
RBC # FLD: 13.7 % — SIGNIFICANT CHANGE UP (ref 10.3–14.5)
SARS-COV-2 IGG SERPL QL IA: NEGATIVE — SIGNIFICANT CHANGE UP
SARS-COV-2 IGM SERPL IA-ACNC: 0.07 INDEX — SIGNIFICANT CHANGE UP
SODIUM SERPL-SCNC: 141 MMOL/L — SIGNIFICANT CHANGE UP (ref 135–145)
TSH SERPL-MCNC: 2.83 UIU/ML — SIGNIFICANT CHANGE UP (ref 0.27–4.2)
WBC # BLD: 7.71 K/UL — SIGNIFICANT CHANGE UP (ref 3.8–10.5)
WBC # FLD AUTO: 7.71 K/UL — SIGNIFICANT CHANGE UP (ref 3.8–10.5)

## 2021-02-24 PROCEDURE — 93455 CORONARY ART/GRFT ANGIO S&I: CPT | Mod: 26

## 2021-02-24 PROCEDURE — 99152 MOD SED SAME PHYS/QHP 5/>YRS: CPT

## 2021-02-24 PROCEDURE — 99239 HOSP IP/OBS DSCHRG MGMT >30: CPT

## 2021-02-24 PROCEDURE — 99232 SBSQ HOSP IP/OBS MODERATE 35: CPT

## 2021-02-24 RX ORDER — METOPROLOL TARTRATE 50 MG
1 TABLET ORAL
Qty: 0 | Refills: 0 | DISCHARGE

## 2021-02-24 RX ORDER — SPIRONOLACTONE 25 MG/1
25 TABLET, FILM COATED ORAL DAILY
Refills: 0 | Status: DISCONTINUED | OUTPATIENT
Start: 2021-02-24 | End: 2021-02-24

## 2021-02-24 RX ORDER — SPIRONOLACTONE 25 MG/1
1 TABLET, FILM COATED ORAL
Qty: 90 | Refills: 3
Start: 2021-02-24 | End: 2022-02-18

## 2021-02-24 RX ORDER — CARVEDILOL PHOSPHATE 80 MG/1
1 CAPSULE, EXTENDED RELEASE ORAL
Qty: 180 | Refills: 3
Start: 2021-02-24 | End: 2022-02-18

## 2021-02-24 RX ORDER — NITROGLYCERIN 6.5 MG
0.4 CAPSULE, EXTENDED RELEASE ORAL
Refills: 0 | Status: DISCONTINUED | OUTPATIENT
Start: 2021-02-24 | End: 2021-02-24

## 2021-02-24 RX ORDER — CARVEDILOL PHOSPHATE 80 MG/1
3.12 CAPSULE, EXTENDED RELEASE ORAL EVERY 12 HOURS
Refills: 0 | Status: DISCONTINUED | OUTPATIENT
Start: 2021-02-24 | End: 2021-02-24

## 2021-02-24 RX ADMIN — Medication 81 MILLIGRAM(S): at 08:38

## 2021-02-24 RX ADMIN — LOSARTAN POTASSIUM 100 MILLIGRAM(S): 100 TABLET, FILM COATED ORAL at 04:35

## 2021-02-24 RX ADMIN — Medication 0.4 MILLIGRAM(S): at 01:47

## 2021-02-24 RX ADMIN — FINASTERIDE 2.5 MILLIGRAM(S): 5 TABLET, FILM COATED ORAL at 04:36

## 2021-02-24 NOTE — DISCHARGE NOTE PROVIDER - NSDCMRMEDTOKEN_GEN_ALL_CORE_FT
carvedilol 3.125 mg oral tablet: 1 tab(s) orally every 12 hours  finasteride 5 mg oral tablet: 0.5 tab(s) orally 3 times a week (M/W/F)  losartan 100 mg oral tablet: 1 tab(s) orally once a day  Plavix 75 mg oral tablet: 1 tab(s) orally once a day  rosuvastatin 20 mg oral tablet: 1 tab(s) orally once a day (at bedtime)  spironolactone 25 mg oral tablet: 1 tab(s) orally once a day  Tapazole 5 mg oral tablet: 1 tab(s) orally once a day   Basic Metabolic Panel1: Basic Metabolic Panel on March 3, 2021  carvedilol 3.125 mg oral tablet: 1 tab(s) orally every 12 hours  finasteride 5 mg oral tablet: 0.5 tab(s) orally 3 times a week (M/W/F)  losartan 100 mg oral tablet: 1 tab(s) orally once a day  Plavix 75 mg oral tablet: 1 tab(s) orally once a day  rosuvastatin 20 mg oral tablet: 1 tab(s) orally once a day (at bedtime)  spironolactone 25 mg oral tablet: 1 tab(s) orally once a day  Tapazole 5 mg oral tablet: 1 tab(s) orally once a day

## 2021-02-24 NOTE — DISCHARGE NOTE PROVIDER - HOSPITAL COURSE
HPI:  69 y/o male with PMHx of prediabetes, coronary disease S/P 4V CABG in [place] in 2013, s/p multiple PCIs (last PCI 12/2019 w/MARCELINA x2 to LAD), HLD, HTN, hyperthyroidism presented to the ED with pain in his left arm associated chest pain. States that his chest pressure sensation came on again 2 days ago, "like a ring around his heart." Initially 2 weeks ago while shoveling, he first experienced these symptoms - first attributing the arm pain to shoveling itself. He went for blood work and exam by his PMD subsequently, with no abnormalities. However, when he started experiencing pain again 2 days ago, his blood pressure was elevated at home and he came to the ED for evaluation. Currently still with left chest pressure 3/10 in duration. Still with some pressure under his left axilla and on his left bicep muscle. Denies palpitations, dyspnea, headache, dizziness, abdominal pain, n/v/d. States that the last time he had a negative nuclear stress test, he still required cardiac catheterization with stent placement.    He is now s/p LHC with Dr. Li.  Non-obstructive CAD.  All stents and grafts patent.  Coreg 3.125 BID to be initiated in lieu of metoprolol.  Spironolactone 25 daily to be started.  ASA discontinued, continue with Plavix daily.  C/w losartan.  Will need follow up in 1 week with BMP.  All prescriptions sent to patients pharmacy. HPI:  71 y/o male with PMHx of prediabetes, coronary disease S/P 4V CABG in [place] in 2013, s/p multiple PCIs (last PCI 12/2019 w/MARCELINA x2 to LAD), HLD, HTN, hyperthyroidism presented to the ED with pain in his left arm associated chest pain. States that his chest pressure sensation came on again 2 days ago, "like a ring around his heart." Initially 2 weeks ago while shoveling, he first experienced these symptoms - first attributing the arm pain to shoveling itself. He went for blood work and exam by his PMD subsequently, with no abnormalities. However, when he started experiencing pain again 2 days ago, his blood pressure was elevated at home and he came to the ED for evaluation. Currently still with left chest pressure 3/10 in duration. Still with some pressure under his left axilla and on his left bicep muscle. Denies palpitations, dyspnea, headache, dizziness, abdominal pain, n/v/d. States that the last time he had a negative nuclear stress test, he still required cardiac catheterization with stent placement.    He is now s/p LHC with Dr. Li.  Non-obstructive CAD.  All stents and grafts patent.  Coreg 3.125 BID to be initiated in lieu of metoprolol.  Spironolactone 25 daily to be started.  ASA discontinued, continue with Plavix daily.  C/w losartan.  Will need follow up in 1 week with BMP.  All prescriptions sent to patients pharmacy.      Vital Signs Last 24 Hrs  T(C): 36.6 (24 Feb 2021 08:19), Max: 36.8 (23 Feb 2021 23:20)  T(F): 97.8 (24 Feb 2021 08:19), Max: 98.2 (23 Feb 2021 23:20)  HR: 54 (24 Feb 2021 11:30) (54 - 75)  BP: 115/68 (24 Feb 2021 11:30) (115/68 - 173/92)  BP(mean): --  RR: 17 (24 Feb 2021 11:30) (17 - 20)  SpO2: 94% (24 Feb 2021 11:30) (93% - 98%)    PHYSICAL EXAM:  Constitutional: No acute distress, alert and oriented by 3  HEENT: AT/NC, EOMI, PERRLA, Normal conjunctiva, no pharyngeal erythema, moist oral mucosa  Respiratory: CTA BL, equal breath sounds, no crackles or wheezing  Cardiovascular: RRR, no edema  Gastrointestinal: soft, Non-tender, Non-distended + Bowel sounds, no rebound or guarding  Musculoskeletal: No joint edema  Neurological: CN 2-12 grossly intact, no focal deficits  Skin: warm, dry and intact  Psychiatric: normal mood and affect    33 minutes spent on discharge.

## 2021-02-24 NOTE — DISCHARGE NOTE PROVIDER - NSDCFUADDINST_GEN_ALL_CORE_FT
Choose lean meats and poultry without skin and prepare them without added saturated and trans fat.  Eat fish at least twice a week. Recent research shows that eating oily fish containing omega-3 fatty acids (for example, salmon, trout and herring) may help lower your risk of death from coronary artery disease.  Select fat-free, 1 percent fat and low-fat dairy products.  Cut back on foods containing partially hydrogenated vegetable oils to reduce trans fat in your diet.   To lower cholesterol, reduce saturated fat to no more than 5 to 6 percent of total calories. For someone eating 2,000 calories a day, that’s about 13 grams of saturated fat.  Cut back on beverages and foods with added sugars.  Choose and prepare foods with little or no salt. To lower blood pressure, aim to eat no more than 2,400 milligrams of sodium per day. Reducing daily intake to 1,500 mg is desirable because it can lower blood pressure even further.  If you drink alcohol, drink in moderation. That means one drink per day if you’re a woman and two drinks  per day if you’re a man.  Follow the American Heart Association recommendations when you eat out, and keep an eye on your portion sizes.

## 2021-02-24 NOTE — DISCHARGE NOTE PROVIDER - NSDCCPCAREPLAN_GEN_ALL_CORE_FT
PRINCIPAL DISCHARGE DIAGNOSIS  Diagnosis: Chest pain  Assessment and Plan of Treatment: No heavy lifting, driving, sex, tub baths, swimming, or any activity that submerges the lower half of the body in water for 48 hours.  Limited walking and stairs for 48 hours.    Change the bandaid after 24 hours and every 24 hours after that.  Keep the puncture site dry and covered with a bandaid until a scab forms.    Observe the site frequently.  If bleeding or a large lump (the size of a golf ball or bigger) occurs lie flat, apply continuous direct pressure just above the puncture site for at least 10 minutes, and notify your physician immediately.  If the bleeding cannot be controlled, call 911 immediately for assistance.  Notify your physician of pain, swelling or any drainage.    Notify your physician immediately if coldness, numbness, discoloration or pain in your foot occurs.      SECONDARY DISCHARGE DIAGNOSES  Diagnosis: Ejection fraction < 50%  Assessment and Plan of Treatment: Follow up with cardiology in 1 week.  Spironolactone and coreg were added to your medication regimen.

## 2021-02-24 NOTE — PROGRESS NOTE ADULT - SUBJECTIVE AND OBJECTIVE BOX
Department of Cardiology                                                                  [facility]/[place] [address] [address] [facility]                                                            Telephone: [phone]. Fax:[phone]                                                                                 Cardiac Cath NP Note      Narrative:    71yo male w/PMH prediabetes, coronary disease S/P CABG in [place] in 2012, s/p multiple PCIs- last PCI 12/2019 w/MARCELINA x2, HLD, HTN, and hyperthyroidism. Patient presents to ED c/o constant dull left chest pressure 2/10 w/intermittent increase in discomfort (that resolves on its own) and radiation down left arm for the past several days. Pain is non reproducible w/o aggravating or modifying factors. He states that last week he thinks he overexerted himself shoveling snow, and has been under increased stress regarding caring of his parents and reminiscing about his son that passed away 8yrs ago. For the last week he has been waking up in the middle of the night anxious, diaphoretic and tearful. He normally exercises routinely, 1-1.5 hours 3x/week,  and denies chest pain or SOB during, though he has not been as active over the last month or so. He follows closely w/cardiologist Dr. Oviedo. Per LOV 11/20 "TTE from 11/2019 LVEF 46%, mild-mod MR, and moderate AS, and apical inferior hypokinesis. Subsequent LHC in 12/2019 with LIMA to LAD dz s/p Burkett MARCELINA x2. He reports compliance with his medications. Recent bloodwork reviewed, LDL 70, Hgb A1C 6.4.". Currently denies palpitations, irregular and/or rapid heart beat, SOB, JURADO, syncope/near syncope, dizziness, orthopnea, PND, cough, edema, f/c, n/v/d, hematuria, or hematochezia.     Patient now presents to the cardiology holding area for LHC with Dr. Li    	  MEDICATIONS:  losartan 100 milliGRAM(s) Oral daily  metoprolol succinate ER 25 milliGRAM(s) Oral at bedtime  nitroglycerin     SubLingual 0.4 milliGRAM(s) SubLingual every 5 minutes PRN            atorvastatin 80 milliGRAM(s) Oral at bedtime  finasteride 2.5 milliGRAM(s) Oral <User Schedule>  methimazole 5 milliGRAM(s) Oral daily    aspirin enteric coated 81 milliGRAM(s) Oral daily  clopidogrel Tablet 75 milliGRAM(s) Oral at bedtime  enoxaparin Injectable 40 milliGRAM(s) SubCutaneous daily      ASA:  Mallampati:  Bleeding Risk:  Creatinine:  GFR:    PHYSICAL EXAM:    T(C): 36.7 (02-24-21 @ 04:29), Max: 36.8 (02-23-21 @ 23:20)  HR: 65 (02-24-21 @ 04:29) (60 - 76)  BP: 138/78 (02-24-21 @ 04:29) (133/78 - 140/75)  RR: 18 (02-23-21 @ 23:20) (18 - 20)  SpO2: 93% (02-24-21 @ 04:29) (93% - 98%)  Wt(kg): --    I&O's Summary      Daily Height in cm: 182.88 (23 Feb 2021 10:27)    Daily     Constitutional: A & O x 3  HEENT:   Normal oral mucosa, PERRL, EOMI	  Cardiovascular: Normal S1 S2, No JVD, No murmurs, No edema  Respiratory: Lungs clear to auscultation	  Gastrointestinal:  Soft, Non-tender, + BS	  Skin: No rashes, No ecchymoses, No cyanosis  Neurologic: Non-focal  Extremities: Normal range of motion, No clubbing, cyanosis or edema  Vascular: Peripheral pulses palpable 2+ bilaterally    TELEMETRY: 	    ECG:  	  RADIOLOGY:   DIAGNOSTIC TESTING:  [ ] Echocardiogram:  < from: TTE Echo Complete w/ Contrast w/ Doppler (02.23.21 @ 13:18) >  Left Ventricle: The left ventricular internal cavity size is moderately increased.  Left ventricular ejection fraction, by visual estimation, is 35 to 40%. Spectral Doppler shows impaired relaxation pattern of left ventricular myocardial filling (Grade I diastolic dysfunction).      LV Wall Scoring:  The basal and mid inferolateral wall is akinetic. The basal and mid  anterolateral wall is hypokinetic.    Right Ventricle: The right ventricularsize is normal. RV systolic function is normal. TV S' 0.1 m/s.  Left Atrium: Mildly enlarged left atrium.  Right Atrium: Normal right atrial size.  Pericardium: There is a significant pericardial fat pad present.  Mitral Valve: Thickening of the anterior and posterior mitral valve leaflets. Mild to moderate mitral valve regurgitation is seen.  Tricuspid Valve: The tricuspid valve is normal in structure. Trivial tricuspid regurgitation is visualized.  Aortic Valve: Moderate aortic stenosis is present. Peak transaortic gradient equals 26.3 mmHg, mean transaortic gradient equals 15.3 mmHg, the calculated aortic valve area equals 1.12 cm² by the continuity equation consistent with moderate aortic stenosis. No evidence of aortic valve regurgitation is seen. The Dimesionless Index value is 0.25.  Pulmonic Valve: Trace pulmonic valve regurgitation.  Aorta: The aortic root is normal in size and structure.  Pulmonary Artery: The pulmonary artery is not well seen.  Venous: The pulmonary veins appear normal. The inferior vena cava was normal sized, with respiratory size variation greater than 50%.  In comparison to the previous echocardiogram(s): Prior examinations are available and were reviewed for comparison purposes. Compared to prior imaging on 4/1/2016, left ventricular function is unchanged, and there is moderate aortic stenosis with valve area of 1.12 cm^2.      Summary:   1. Left ventricular ejection fraction, by visual estimation, is 35 to 40%.   2. Basal and mid inferolateral walland basal and mid anterolateral wall are abnormal as described above.   3. Spectral Doppler shows impaired relaxation pattern of left ventricular myocardial filling (Grade I diastolic dysfunction).   4. Mildly enlarged left atrium.   5. Normal right atrial size.   6. Mild to moderate mitral valve regurgitation.   7. Thickening of the anterior and posterior mitral valve leaflets.   8. Moderate aortic valve stenosis.   9. Peak transaortic gradient equals 26.3 mmHg, mean transaortic gradient equals 15.3 mmHg, the calculated aortic valve area equals 1.12 cm² by the continuity equation consistent with moderate aortic stenosis.  10. The Dimesionless Index value is 0.25.  11. Compared to prior imaging on 4/1/2016, left ventricular function is unchanged, and there is moderate aortic stenosis with valve area of 1.12 cm^2.      OTHER: 	    LABS:	 	    CARDIAC MARKERS:                                  13.9   7.71  )-----------( 263      ( 24 Feb 2021 04:20 )             43.2     02-24    141  |  102  |  24.0<H>  ----------------------------<  112<H>  4.3   |  28.0  |  0.90    Ca    9.1      24 Feb 2021 04:20  Phos  3.3     02-24  Mg     2.1     02-24    TPro  6.7  /  Alb  4.1  /  TBili  0.5  /  DBili  x   /  AST  12  /  ALT  13  /  AlkPhos  88  02-24    proBNP: Serum Pro-Brain Natriuretic Peptide: 84 pg/mL (02-23 @ 20:16)    HgA1c: A1C with Estimated Average Glucose (02.24.21 @ 04:20)    A1C with Estimated Average Glucose Result: 6.0 %    Estimated Average Glucose: 126 mg/dL  TSH: Thyroid Stimulating Hormone, Serum: 2.83 uIU/mL (02-24 @ 04:20)

## 2021-02-24 NOTE — DISCHARGE NOTE PROVIDER - CARE PROVIDER_API CALL
Edison Oviedo)  Cardiovascular Disease  [address] [facility], [address] [place] [address]  Phone: [phone]  Fax: [phone]  Established Patient  Follow Up Time: 1 week

## 2021-02-24 NOTE — DISCHARGE NOTE PROVIDER - NSDCCPTREATMENT_GEN_ALL_CORE_FT
PRINCIPAL PROCEDURE  Procedure: Left heart cardiac cath  Findings and Treatment: You had a left heart cardiac catheterization with Dr. Li  There were no interventions performed during this procedure.  You are to continue single antiplatelet therapy with Plavix.  Do not continue aspirin.  Metoprolol was discontinued.    Coreg 3.125mg twice per day is to be started.    Spironolactone 25mg once per day is to be started.  You are to have a blood work done in 1 week.  Continue all other medications as originally prescribed  Do not stop taking these medications without speaking with your cardiologist first.  Prescriptions for Coreg and spironolactone  were sent to your pharmacy.  Follow up with your cardiologist in 1 week.  Groin precautions

## 2021-02-24 NOTE — PROGRESS NOTE ADULT - SUBJECTIVE AND OBJECTIVE BOX
[place] [facility]                          [address] [place] [address]                       Phone: [phone]. Fax:[phone]                      ________________________________________________           Reason for follow up/Overnight events:   He is now s/p LHC via right groin approach with Dr. Li    Official report to follow  Total heparin  Total dye 61ml  Non-obstructive CAD.  Stents patent.  Angioseal to right groin    HPI:  69 y/o male with PMHx of prediabetes, coronary disease S/P 4V CABG in [place] in 2013, s/p multiple PCIs (last PCI 12/2019 w/MARCELINA x2 to LAD), HLD, HTN, hyperthyroidism presented to the ED with pain in his left arm associated chest pain. States that his chest pressure sensation came on again 2 days ago, "like a ring around his heart." Initially 2 weeks ago while shoveling, he first experienced these symptoms - first attributing the arm pain to shoveling itself. He went for blood work and exam by his PMD subsequently, with no abnormalities. However, when he started experiencing pain again 2 days ago, his blood pressure was elevated at home and he came to the ED for evaluation. Currently still with left chest pressure 3/10 in duration. Still with some pressure under his left axilla and on his left bicep muscle. Denies palpitations, dyspnea, headache, dizziness, abdominal pain, n/v/d. States that the last time he had a negative nuclear stress test, he still required cardiac catheterization with stent placement.    In the ED,  Vital Signs T(F): Max: 97.8 HR: 75 BP: 140/75 RR: 20 SpO2: 98% on RA  Labs unremarkable, troponin T neg x 3, proBNP 84  CXR no acute infiltrates, improvement in atelectases  EKG SR with PACs at 72bpm, nonspecific tw abn (reviewed on alpha)  TTE: showed EF 35-40% (EF was 40-45% in 2016 and reportedly 46% in 2019) (23 Feb 2021 22:58)      ROS: All review of systems negative unless indicated otherwise below.                          LAB RESULTS                   COMPLETE BLOOD COUNT( 24 Feb 2021 04:20 )                            13.9 g/dL  7.71 K/uL )---------------( 263 K/uL                        43.2 %      Automated Differential     Auto Basophil # - 0.04 K/uL  Auto Basophil % - 0.5 %  Auto Eosinophil # - 0.21 K/uL  Auto Eosinophil % - 2.7 %  Auto Immature Granulocyte # - X      Auto Immature Granulocyte % - 0.3 %  Auto Lymphocyte # - 1.24 K/uL  Auto Lymphocyte % - 16.1 %  Auto Monocyte # - 0.60 K/uL  Auto Monocyte % - 7.8 %  Auto Neutrophil # - 5.60 K/uL  Auto Neutrophil % - 72.6 %                                  CHEMISTRY                 Basic Metabolic Panel (02-24-21 @ 04:20)    141  |  102  |  24.0<H>  ----------------------------<  112<H>  4.3   |  28.0  |  0.90    Ca    9.1      24 Feb 2021 04:20  Phos  3.3     02-24  Mg     2.1     02-24                    Liver Functions (02-24-21 @ 04:20))  TPro  6.7  /  Alb  4.1  /  TBili  0.5  /  DBili  x   /  AST  12  /  ALT  13  /  AlkPhos  88     PT/INR/PTT ( 23 Feb 2021 11:31 )                        :                       :      13.0         :       33.9                  .        .                   .              .           .       1.13        .                                                                   Cardiac Enzymes   ( 23 Feb 2021 20:16 )  Troponin T  <0.01,  CPK  X    , CKMB  X    , BNP 84       , ( 23 Feb 2021 15:57 )  Troponin T  <0.01,  CPK  X    , CKMB  X    , BNP X        , ( 23 Feb 2021 11:31 )  Troponin T  <0.01,  CPK  X    , CKMB  X    , BNP X                                  MICROBIOLOGY/CULTURES:        Rapid RVP Result: NotDetec (02-23-21 @ 11:40)                          RADIOLOGY RESULTS: Personally visualized                           CARDIOLOGY RESULTS: Official Report/Preliminary Verbal Reports    ECHO: < from: TTE Echo Complete w/ Contrast w/ Doppler (02.23.21 @ 13:18) >  Left Ventricle: The left ventricular internal cavity size is moderately increased.  Left ventricular ejection fraction, by visual estimation, is 35 to 40%. Spectral Doppler shows impaired relaxation pattern of left ventricular myocardial filling (Grade I diastolic dysfunction).      LV Wall Scoring:  The basal and mid inferolateral wall is akinetic. The basal and mid  anterolateral wall is hypokinetic.    Right Ventricle: The right ventricularsize is normal. RV systolic function is normal. TV S' 0.1 m/s.  Left Atrium: Mildly enlarged left atrium.  Right Atrium: Normal right atrial size.  Pericardium: There is a significant pericardial fat pad present.  Mitral Valve: Thickening of the anterior and posterior mitral valve leaflets. Mild to moderate mitral valve regurgitation is seen.  Tricuspid Valve: The tricuspid valve is normal in structure. Trivial tricuspid regurgitation is visualized.  Aortic Valve: Moderate aortic stenosis is present. Peak transaortic gradient equals 26.3 mmHg, mean transaortic gradient equals 15.3 mmHg, the calculated aortic valve area equals 1.12 cm² by the continuity equation consistent with moderate aortic stenosis. No evidence of aortic valve regurgitation is seen. The Dimesionless Index value is 0.25.  Pulmonic Valve: Trace pulmonic valve regurgitation.  Aorta: The aortic root is normal in size and structure.  Pulmonary Artery: The pulmonary artery is not well seen.  Venous: The pulmonary veins appear normal. The inferior vena cava was normal sized, with respiratory size variation greater than 50%.  In comparison to the previous echocardiogram(s): Prior examinations are available and were reviewed for comparison purposes. Compared to prior imaging on 4/1/2016, left ventricular function is unchanged, and there is moderate aortic stenosis with valve area of 1.12 cm^2.      Summary:   1. Left ventricular ejection fraction, by visual estimation, is 35 to 40%.   2. Basal and mid inferolateral walland basal and mid anterolateral wall are abnormal as described above.   3. Spectral Doppler shows impaired relaxation pattern of left ventricular myocardial filling (Grade I diastolic dysfunction).   4. Mildly enlarged left atrium.   5. Normal right atrial size.   6. Mild to moderate mitral valve regurgitation.   7. Thickening of the anterior and posterior mitral valve leaflets.   8. Moderate aortic valve stenosis.   9. Peak transaortic gradient equals 26.3 mmHg, mean transaortic gradient equals 15.3 mmHg, the calculated aortic valve area equals 1.12 cm² by the continuity equation consistent with moderate aortic stenosis.  10. The Dimesionless Index value is 0.25.  11. Compared to prior imaging on 4/1/2016, left ventricular function is unchanged, and there is moderate aortic stenosis with valve area of 1.12 cm^2.        STRESS:   CATH:                         CARDIOLOGY REVIEW: Personally visualized and reviewed                             DAILY WEIGHTS - 48 HOUR TREND     Daily                              INTAKE AND OUTPUT - 48 HOUR TREND       HOME MEDICATIONS:  finasteride 5 mg oral tablet: 0.5 tab(s) orally 3 times a week (M/W/F) (24 Feb 2021 08:33)  losartan 100 mg oral tablet: 1 tab(s) orally once a day (24 Feb 2021 08:33)  metoprolol succinate 25 mg oral tablet, extended release: 1 tab(s) orally once a day (24 Feb 2021 08:33)  rosuvastatin 20 mg oral tablet: 1 tab(s) orally once a day (at bedtime) (24 Feb 2021 08:33)  Tapazole 5 mg oral tablet: 1 tab(s) orally once a day (24 Feb 2021 08:33)                             Current Admission Active Medications    aspirin enteric coated 81 milliGRAM(s) Oral daily  atorvastatin 80 milliGRAM(s) Oral at bedtime  clopidogrel Tablet 75 milliGRAM(s) Oral at bedtime  enoxaparin Injectable 40 milliGRAM(s) SubCutaneous daily  finasteride 2.5 milliGRAM(s) Oral <User Schedule>  losartan 100 milliGRAM(s) Oral daily  methimazole 5 milliGRAM(s) Oral daily  metoprolol succinate ER 25 milliGRAM(s) Oral at bedtime  nitroglycerin     SubLingual 0.4 milliGRAM(s) SubLingual every 5 minutes PRN Chest Pain                        PHYSICAL EXAM:    Vital Signs Last 24 Hrs  T(C): 36.6 (24 Feb 2021 08:19), Max: 36.8 (23 Feb 2021 23:20)  T(F): 97.8 (24 Feb 2021 08:19), Max: 98.2 (23 Feb 2021 23:20)  HR: 63 (24 Feb 2021 08:19) (60 - 76)  BP: 143/82 (24 Feb 2021 08:19) (133/78 - 143/82)  BP(mean): --  RR: 18 (24 Feb 2021 08:19) (18 - 20)  SpO2: 97% (24 Feb 2021 08:19) (93% - 98%)    GENERAL: NAD  NECK: Supple, No JVD  NERVOUS SYSTEM:  Alert & Oriented X3, non focal neuro exam.   CHEST/LUNG: clear lungs, No rales, rhonchi, wheezing, or rubs  HEART: Regular rate and rhythm; s1 and s2 auscultated, No murmurs, rubs, or gallops  ABDOMEN: Soft, Nontender, Nondistended; Bowel sounds present and normoactive.   EXTREMITIES:  2+ Peripheral Pulses, No clubbing, cyanosis, or edema  CATH SITE: C/D/I dressing.  no bleeding or hematoma

## 2021-02-24 NOTE — PROGRESS NOTE ADULT - ASSESSMENT
69yo male w/PMH prediabetes, coronary disease S/P CABG in [place] in 2012, s/p multiple PCIs- last PCI 12/2019 w/MARCELINA x2, HLD, HTN, and hyperthyroidism.  He is now s/p C with Dr. Li    Chest Pain, CAD  -EKG w/o acute ischemic changes  -Trop negative  -TTE with EF 35-40%  -NPO after MN for NST-pharm tomorrow unless above findings abnormal will then consider [facility]  -c/w Statin  -c/w Plavix only.  ASA to be discontinued.  - Discontinue metoprolol  Carvedilol to be started  - Start spironolactone 25 daily  - s/p [facility] - Groin precautions  - cardiac rehab info provided/referral and communication to cardiac rehab completed  - Follow up with cardiology in 1 week; needs BMP  - Will discharge when groin site and vital signs stable      ICM, EF (11/2019) 46%  -euvolemic on exam  -repeat TTE with EF 35 -40%  -c/w Losartan, carvedilol      HTN  -c/w above med regimen      HLD  -c/w statin
69yo male w/PMH prediabetes, coronary disease S/P CABG in [place] in 2012, s/p multiple PCIs- last PCI 12/2019 w/MARCELINA x2, HLD, HTN, and hyperthyroidism. Patient presents to ED c/o constant dull left chest pressure 2/10 w/intermittent increase in discomfort (that resolves on its own) and radiation down left arm for the past several days and admitted with UA.      ASA 3  mallampati 2  BRA 0.7  GFR 85    -consent to be obtained  -procedure discussed with patient; risks and benefits explained, questions answered  -labs and ECG reviewed

## 2021-02-24 NOTE — DISCHARGE NOTE NURSING/CASE MANAGEMENT/SOCIAL WORK - NSDCPETBCESMAN_GEN_ALL_CORE
If you are a smoker, it is important for your health to stop smoking. Please be aware that second hand smoke is also harmful.
0 = understands/communicates without difficulty

## 2021-02-24 NOTE — DISCHARGE NOTE NURSING/CASE MANAGEMENT/SOCIAL WORK - NSPROEXTENSIONSOFSELF_GEN_A_NUR
none Consent (Nose)/Introductory Paragraph: The rationale for Mohs was explained to the patient and consent was obtained. The risks, benefits and alternatives to therapy were discussed in detail. Specifically, the risks of nasal deformity, changes in the flow of air through the nose, infection, scarring, bleeding, prolonged wound healing, incomplete removal, allergy to anesthesia, nerve injury and recurrence were addressed. Prior to the procedure, the treatment site was clearly identified and confirmed by the patient. All components of Universal Protocol/PAUSE Rule completed.

## 2021-03-05 ENCOUNTER — APPOINTMENT (OUTPATIENT)
Dept: CARDIOLOGY | Facility: CLINIC | Age: 71
End: 2021-03-05
Payer: MEDICARE

## 2021-03-05 VITALS
BODY MASS INDEX: 30.48 KG/M2 | HEART RATE: 75 BPM | TEMPERATURE: 97.8 F | DIASTOLIC BLOOD PRESSURE: 68 MMHG | SYSTOLIC BLOOD PRESSURE: 130 MMHG | HEIGHT: 72 IN | OXYGEN SATURATION: 97 % | WEIGHT: 225 LBS

## 2021-03-05 PROCEDURE — 93000 ELECTROCARDIOGRAM COMPLETE: CPT

## 2021-03-05 PROCEDURE — 99214 OFFICE O/P EST MOD 30 MIN: CPT

## 2021-03-05 RX ORDER — SPIRONOLACTONE 25 MG/1
25 TABLET ORAL DAILY
Qty: 30 | Refills: 1 | Status: DISCONTINUED | COMMUNITY
Start: 2021-03-05 | End: 2021-03-05

## 2021-03-05 RX ORDER — METOPROLOL SUCCINATE 50 MG/1
50 TABLET, EXTENDED RELEASE ORAL DAILY
Qty: 45 | Refills: 1 | Status: DISCONTINUED | COMMUNITY
End: 2021-03-05

## 2021-03-05 RX ORDER — LOSARTAN POTASSIUM 100 MG/1
100 TABLET, FILM COATED ORAL
Qty: 90 | Refills: 2 | Status: DISCONTINUED | COMMUNITY
Start: 2018-09-07 | End: 2021-03-05

## 2021-03-05 RX ORDER — MUPIROCIN 2 G/100G
2 CREAM TOPICAL TWICE DAILY
Qty: 1 | Refills: 0 | Status: DISCONTINUED | COMMUNITY
Start: 2019-10-10 | End: 2021-03-05

## 2021-03-05 RX ORDER — ASPIRIN 325 MG
81 TABLET ORAL DAILY
Refills: 0 | Status: DISCONTINUED | COMMUNITY
End: 2021-03-05

## 2021-03-15 ENCOUNTER — NON-APPOINTMENT (OUTPATIENT)
Age: 71
End: 2021-03-15

## 2021-03-16 PROCEDURE — G0378: CPT

## 2021-03-16 PROCEDURE — 83036 HEMOGLOBIN GLYCOSYLATED A1C: CPT

## 2021-03-16 PROCEDURE — C8929: CPT

## 2021-03-16 PROCEDURE — 84100 ASSAY OF PHOSPHORUS: CPT

## 2021-03-16 PROCEDURE — 86769 SARS-COV-2 COVID-19 ANTIBODY: CPT

## 2021-03-16 PROCEDURE — 84484 ASSAY OF TROPONIN QUANT: CPT

## 2021-03-16 PROCEDURE — C1760: CPT

## 2021-03-16 PROCEDURE — 99153 MOD SED SAME PHYS/QHP EA: CPT

## 2021-03-16 PROCEDURE — 93005 ELECTROCARDIOGRAM TRACING: CPT

## 2021-03-16 PROCEDURE — 84443 ASSAY THYROID STIM HORMONE: CPT

## 2021-03-16 PROCEDURE — 36000 PLACE NEEDLE IN VEIN: CPT

## 2021-03-16 PROCEDURE — C1887: CPT

## 2021-03-16 PROCEDURE — 83880 ASSAY OF NATRIURETIC PEPTIDE: CPT

## 2021-03-16 PROCEDURE — 85610 PROTHROMBIN TIME: CPT

## 2021-03-16 PROCEDURE — A9500: CPT

## 2021-03-16 PROCEDURE — 80053 COMPREHEN METABOLIC PANEL: CPT

## 2021-03-16 PROCEDURE — 36415 COLL VENOUS BLD VENIPUNCTURE: CPT

## 2021-03-16 PROCEDURE — 85730 THROMBOPLASTIN TIME PARTIAL: CPT

## 2021-03-16 PROCEDURE — 71046 X-RAY EXAM CHEST 2 VIEWS: CPT

## 2021-03-16 PROCEDURE — 99152 MOD SED SAME PHYS/QHP 5/>YRS: CPT

## 2021-03-16 PROCEDURE — 86803 HEPATITIS C AB TEST: CPT

## 2021-03-16 PROCEDURE — 93455 CORONARY ART/GRFT ANGIO S&I: CPT

## 2021-03-16 PROCEDURE — C1769: CPT

## 2021-03-16 PROCEDURE — 85025 COMPLETE CBC W/AUTO DIFF WBC: CPT

## 2021-03-16 PROCEDURE — 0225U NFCT DS DNA&RNA 21 SARSCOV2: CPT

## 2021-03-16 PROCEDURE — 78451 HT MUSCLE IMAGE SPECT SING: CPT

## 2021-03-16 PROCEDURE — 83735 ASSAY OF MAGNESIUM: CPT

## 2021-03-16 PROCEDURE — 99285 EMERGENCY DEPT VISIT HI MDM: CPT | Mod: 25

## 2021-03-16 PROCEDURE — C1894: CPT

## 2021-03-19 ENCOUNTER — APPOINTMENT (OUTPATIENT)
Dept: CARDIOLOGY | Facility: CLINIC | Age: 71
End: 2021-03-19
Payer: MEDICARE

## 2021-03-19 ENCOUNTER — NON-APPOINTMENT (OUTPATIENT)
Age: 71
End: 2021-03-19

## 2021-03-19 VITALS
WEIGHT: 224 LBS | DIASTOLIC BLOOD PRESSURE: 78 MMHG | HEART RATE: 56 BPM | HEIGHT: 72 IN | OXYGEN SATURATION: 98 % | TEMPERATURE: 98 F | BODY MASS INDEX: 30.34 KG/M2 | SYSTOLIC BLOOD PRESSURE: 151 MMHG

## 2021-03-19 VITALS — DIASTOLIC BLOOD PRESSURE: 68 MMHG | SYSTOLIC BLOOD PRESSURE: 122 MMHG

## 2021-03-19 DIAGNOSIS — E78.00 PURE HYPERCHOLESTEROLEMIA, UNSPECIFIED: ICD-10-CM

## 2021-03-19 PROCEDURE — 99214 OFFICE O/P EST MOD 30 MIN: CPT

## 2021-03-19 PROCEDURE — 93000 ELECTROCARDIOGRAM COMPLETE: CPT

## 2021-03-31 ENCOUNTER — APPOINTMENT (OUTPATIENT)
Dept: UROLOGY | Facility: CLINIC | Age: 71
End: 2021-03-31
Payer: MEDICARE

## 2021-03-31 VITALS
SYSTOLIC BLOOD PRESSURE: 134 MMHG | WEIGHT: 220 LBS | BODY MASS INDEX: 29.8 KG/M2 | HEIGHT: 72 IN | TEMPERATURE: 97.7 F | RESPIRATION RATE: 15 BRPM | HEART RATE: 69 BPM | DIASTOLIC BLOOD PRESSURE: 71 MMHG

## 2021-03-31 DIAGNOSIS — N44.2 BENIGN CYST OF TESTIS: ICD-10-CM

## 2021-03-31 PROCEDURE — 99203 OFFICE O/P NEW LOW 30 MIN: CPT

## 2021-03-31 NOTE — HISTORY OF PRESENT ILLNESS
[Urinary Frequency] : urinary frequency [Nocturia] : nocturia [None] : None [FreeTextEntry1] : With cyst to right testicle x 6 years, occasional pain to right testicle, uses Tylenol as needed. With frequency and nocturia, awakens 3x/night to void, attributes to fluids. H/o elevates PSA and prostate biopsy, states no cancer.

## 2021-03-31 NOTE — ASSESSMENT
[FreeTextEntry1] : Epididymal mass\par \par Will send for testicular US\par RTO 2-3 weeks\par \par Prostate cancer screening\par \par PSA 9/2020 2.3 ng/mL\par SUDHA complete today\par To obtain records from previous urologist\par H/o elevated PSA and prostate biopsy

## 2021-03-31 NOTE — PHYSICAL EXAM
[General Appearance - Well Developed] : well developed [Normal Appearance] : normal appearance [General Appearance - Well Nourished] : well nourished [Well Groomed] : well groomed [General Appearance - In No Acute Distress] : no acute distress [Bowel Sounds] : normal bowel sounds [Abdomen Soft] : soft [Abdomen Tenderness] : non-tender [Urethral Meatus] : meatus normal [Penis Abnormality] : normal circumcised penis [Scrotum] : the scrotum was normal [Testes Mass (___cm)] : there were no testicular masses [Testes Tenderness] : no tenderness of the testes [Anus Abnormality] : the anus and perineum were normal [Rectal Exam - Rectum] : digital rectal exam was normal [Prostate Tenderness] : the prostate was not tender [No Prostate Nodules] : no prostate nodules [Normal Station and Gait] : the gait and station were normal for the patient's age [Skin Color & Pigmentation] : normal skin color and pigmentation [Oriented To Time, Place, And Person] : oriented to person, place, and time [Affect] : the affect was normal [Mood] : the mood was normal [Not Anxious] : not anxious [FreeTextEntry1] : Minimal prostate enlargement, smooth, symmetric. Right epididymis tender to touch, pea sized palpable mass

## 2021-03-31 NOTE — LETTER BODY
[Dear  ___] : Dear  [unfilled], [Courtesy Letter:] : I had the pleasure of seeing your patient, [unfilled], in my office today. [Please see my note below.] : Please see my note below. [Sincerely,] : Sincerely, [FreeTextEntry3] : Ed\par \par James Sharma MD\par [facility]\par  of Urology\par [employer] [facility]\par

## 2021-04-01 ENCOUNTER — OUTPATIENT (OUTPATIENT)
Dept: OUTPATIENT SERVICES | Facility: HOSPITAL | Age: 71
LOS: 1 days | End: 2021-04-01
Payer: MEDICARE

## 2021-04-01 ENCOUNTER — APPOINTMENT (OUTPATIENT)
Dept: ULTRASOUND IMAGING | Facility: CLINIC | Age: 71
End: 2021-04-01
Payer: MEDICARE

## 2021-04-01 DIAGNOSIS — Z95.1 PRESENCE OF AORTOCORONARY BYPASS GRAFT: Chronic | ICD-10-CM

## 2021-04-01 DIAGNOSIS — Z90.49 ACQUIRED ABSENCE OF OTHER SPECIFIED PARTS OF DIGESTIVE TRACT: Chronic | ICD-10-CM

## 2021-04-01 DIAGNOSIS — Z98.89 OTHER SPECIFIED POSTPROCEDURAL STATES: Chronic | ICD-10-CM

## 2021-04-01 DIAGNOSIS — N44.2 BENIGN CYST OF TESTIS: ICD-10-CM

## 2021-04-01 DIAGNOSIS — Z95.5 PRESENCE OF CORONARY ANGIOPLASTY IMPLANT AND GRAFT: Chronic | ICD-10-CM

## 2021-04-01 PROCEDURE — 76870 US EXAM SCROTUM: CPT | Mod: 26

## 2021-04-01 PROCEDURE — 76870 US EXAM SCROTUM: CPT

## 2021-04-23 ENCOUNTER — APPOINTMENT (OUTPATIENT)
Dept: CARDIOLOGY | Facility: CLINIC | Age: 71
End: 2021-04-23
Payer: MEDICARE

## 2021-04-26 LAB
ANION GAP SERPL CALC-SCNC: 14 MMOL/L
BUN SERPL-MCNC: 15 MG/DL
CALCIUM SERPL-MCNC: 9 MG/DL
CHLORIDE SERPL-SCNC: 102 MMOL/L
CO2 SERPL-SCNC: 25 MMOL/L
CREAT SERPL-MCNC: 1.03 MG/DL
GLUCOSE SERPL-MCNC: 127 MG/DL
POTASSIUM SERPL-SCNC: 4.1 MMOL/L
SODIUM SERPL-SCNC: 141 MMOL/L

## 2021-05-07 ENCOUNTER — NON-APPOINTMENT (OUTPATIENT)
Age: 71
End: 2021-05-07

## 2021-05-07 ENCOUNTER — APPOINTMENT (OUTPATIENT)
Dept: CARDIOLOGY | Facility: CLINIC | Age: 71
End: 2021-05-07
Payer: MEDICARE

## 2021-05-07 VITALS
HEIGHT: 72 IN | BODY MASS INDEX: 28.31 KG/M2 | SYSTOLIC BLOOD PRESSURE: 111 MMHG | HEART RATE: 81 BPM | TEMPERATURE: 98 F | OXYGEN SATURATION: 97 % | WEIGHT: 209 LBS | DIASTOLIC BLOOD PRESSURE: 62 MMHG

## 2021-05-07 PROCEDURE — 99214 OFFICE O/P EST MOD 30 MIN: CPT | Mod: 25

## 2021-05-07 PROCEDURE — 93000 ELECTROCARDIOGRAM COMPLETE: CPT

## 2021-05-07 RX ORDER — ALBUTEROL SULFATE 90 UG/1
108 (90 BASE) AEROSOL, METERED RESPIRATORY (INHALATION)
Qty: 1 | Refills: 1 | Status: DISCONTINUED | COMMUNITY
Start: 2018-11-02 | End: 2021-05-07

## 2021-05-08 ENCOUNTER — NON-APPOINTMENT (OUTPATIENT)
Age: 71
End: 2021-05-08

## 2021-05-08 LAB — TSH SERPL-ACNC: 1.82 UIU/ML

## 2021-05-10 ENCOUNTER — APPOINTMENT (OUTPATIENT)
Dept: UROLOGY | Facility: CLINIC | Age: 71
End: 2021-05-10
Payer: MEDICARE

## 2021-05-10 VITALS
SYSTOLIC BLOOD PRESSURE: 110 MMHG | WEIGHT: 209 LBS | BODY MASS INDEX: 28.31 KG/M2 | HEIGHT: 72 IN | TEMPERATURE: 97 F | HEART RATE: 73 BPM | DIASTOLIC BLOOD PRESSURE: 72 MMHG

## 2021-05-10 PROCEDURE — 99212 OFFICE O/P EST SF 10 MIN: CPT

## 2021-05-10 NOTE — LETTER BODY
[Dear  ___] : Dear  [unfilled], [Courtesy Letter:] : I had the pleasure of seeing your patient, [unfilled], in my office today. [Please see my note below.] : Please see my note below. [Sincerely,] : Sincerely, [FreeTextEntry3] : Ed\par \par James Sharma MD\par [facility]\par  of Urology\par [employer] at [facility]\par  [DrJalen  ___] : Dr. ALARCON

## 2021-05-10 NOTE — HISTORY OF PRESENT ILLNESS
[FreeTextEntry1] : Patient presents to review ultrasound. \par no more scrotal pain. no urinary issues. no scrotal swelling. no urgency or hematuria. Overall energy level is good. \par \par

## 2021-05-10 NOTE — ASSESSMENT
[FreeTextEntry1] : Impression:\par \par epididymal cyst, asymptomatic now. \par \par plan:\par \par no follow up needed. \par will follow up PRN

## 2021-05-11 ENCOUNTER — NON-APPOINTMENT (OUTPATIENT)
Age: 71
End: 2021-05-11

## 2021-05-11 LAB
ANION GAP SERPL CALC-SCNC: 10 MMOL/L
BUN SERPL-MCNC: 18 MG/DL
CALCIUM SERPL-MCNC: 9.6 MG/DL
CHLORIDE SERPL-SCNC: 103 MMOL/L
CO2 SERPL-SCNC: 28 MMOL/L
CREAT SERPL-MCNC: 1.08 MG/DL
GLUCOSE SERPL-MCNC: 109 MG/DL
POTASSIUM SERPL-SCNC: 5.4 MMOL/L
SODIUM SERPL-SCNC: 142 MMOL/L

## 2021-05-17 ENCOUNTER — NON-APPOINTMENT (OUTPATIENT)
Age: 71
End: 2021-05-17

## 2021-05-17 LAB
ANION GAP SERPL CALC-SCNC: 10 MMOL/L
BUN SERPL-MCNC: 21 MG/DL
CALCIUM SERPL-MCNC: 9.5 MG/DL
CHLORIDE SERPL-SCNC: 102 MMOL/L
CO2 SERPL-SCNC: 29 MMOL/L
CREAT SERPL-MCNC: 1.05 MG/DL
GLUCOSE SERPL-MCNC: 124 MG/DL
POTASSIUM SERPL-SCNC: 4.2 MMOL/L
SODIUM SERPL-SCNC: 141 MMOL/L

## 2021-06-08 ENCOUNTER — APPOINTMENT (OUTPATIENT)
Dept: ENDOCRINOLOGY | Facility: CLINIC | Age: 71
End: 2021-06-08
Payer: MEDICARE

## 2021-06-08 VITALS
HEART RATE: 67 BPM | DIASTOLIC BLOOD PRESSURE: 72 MMHG | HEIGHT: 72 IN | SYSTOLIC BLOOD PRESSURE: 120 MMHG | BODY MASS INDEX: 29.12 KG/M2 | WEIGHT: 215 LBS | OXYGEN SATURATION: 97 %

## 2021-06-08 DIAGNOSIS — R73.03 PREDIABETES.: ICD-10-CM

## 2021-06-08 PROCEDURE — 99214 OFFICE O/P EST MOD 30 MIN: CPT

## 2021-06-08 NOTE — PHYSICAL EXAM
[Alert] : alert [Well Nourished] : well nourished [No Acute Distress] : no acute distress [Well Developed] : well developed [Normal Sclera/Conjunctiva] : normal sclera/conjunctiva [EOMI] : extra ocular movement intact [No Proptosis] : no proptosis [Normal Oropharynx] : the oropharynx was normal [Thyroid Not Enlarged] : the thyroid was not enlarged [No Thyroid Nodules] : no palpable thyroid nodules [No Respiratory Distress] : no respiratory distress [No Accessory Muscle Use] : no accessory muscle use [Clear to Auscultation] : lungs were clear to auscultation bilaterally [Normal S1, S2] : normal S1 and S2 [Normal Rate] : heart rate was normal [Regular Rhythm] : with a regular rhythm [No Edema] : no peripheral edema [Pedal Pulses Normal] : the pedal pulses are present [Normal Anterior Cervical Nodes] : no anterior cervical lymphadenopathy [No Spinal Tenderness] : no spinal tenderness [Spine Straight] : spine straight [No Stigmata of Cushings Syndrome] : no stigmata of Cushings Syndrome [Normal Gait] : normal gait [Normal Strength/Tone] : muscle strength and tone were normal [No Rash] : no rash [Acanthosis Nigricans] : no acanthosis nigricans [Normal Reflexes] : deep tendon reflexes were 2+ and symmetric [No Tremors] : no tremors [Oriented x3] : oriented to person, place, and time [de-identified] : mild thyromegaly

## 2021-06-08 NOTE — HISTORY OF PRESENT ILLNESS
[FreeTextEntry1] : HPI\par pt had Covid in April \par had cough \par  weight loss 1 lb per day \par  had palpitaitons \par  TFT done  Wnl \par  has been doing ok now \par \par PreDM diet control \par \par Hyperthyroidism due to Graves diseae  on MMI  5mg qd\par Noduel post FNA \par \par \par Follow ups:\par \par Opthalmology\par  been overall stable \par saw opthalmology - had new lens  put in on right eye- vision is better for reading but ahs to wear distance glasses\par other eye is  not functional  \par \par cut out all red meat \par  has been trying towatch  carbs \par \par \par did  have CP- was started on entresto \par  in past EF 45 % \par  now was 30-35% - entresto has helped- no CP since starting this  - med has been gradually titrated up \par \par \par

## 2021-06-08 NOTE — ASSESSMENT
[FreeTextEntry1] : Imp/Plan \par PreDM- cont to watch dietnad exercise  \par \par post Covid  advised  vax after 3 motnhs \par  Hyperthryodism due to graves disease- cont MMI 5 mg qd \par \par Thryodi nodule- stable onsono - recehek Dec 2021 \par h/o inc uri acid check  labs0 will follow u pwith PMD if gets symtposm \par \par low B12 cabn add MVI \par \par low VIt [place] OTC\par \par

## 2021-07-09 ENCOUNTER — APPOINTMENT (OUTPATIENT)
Dept: CARDIOLOGY | Facility: CLINIC | Age: 71
End: 2021-07-09
Payer: MEDICARE

## 2021-07-09 ENCOUNTER — NON-APPOINTMENT (OUTPATIENT)
Age: 71
End: 2021-07-09

## 2021-07-09 VITALS
BODY MASS INDEX: 29.8 KG/M2 | RESPIRATION RATE: 17 BRPM | HEART RATE: 49 BPM | SYSTOLIC BLOOD PRESSURE: 146 MMHG | TEMPERATURE: 98 F | HEIGHT: 72 IN | OXYGEN SATURATION: 96 % | WEIGHT: 220 LBS | DIASTOLIC BLOOD PRESSURE: 82 MMHG

## 2021-07-09 DIAGNOSIS — I50.22 CHRONIC SYSTOLIC (CONGESTIVE) HEART FAILURE: ICD-10-CM

## 2021-07-09 PROCEDURE — 93000 ELECTROCARDIOGRAM COMPLETE: CPT

## 2021-07-09 PROCEDURE — 99214 OFFICE O/P EST MOD 30 MIN: CPT

## 2021-07-09 RX ORDER — FINASTERIDE 5 MG/1
5 TABLET, FILM COATED ORAL
Qty: 90 | Refills: 3 | Status: ACTIVE | COMMUNITY

## 2021-07-09 NOTE — PHYSICAL EXAM
[Well Developed] : well developed [Well Nourished] : well nourished [No Acute Distress] : no acute distress [Normal Conjunctiva] : normal conjunctiva [Normal Venous Pressure] : normal venous pressure [No Carotid Bruit] : no carotid bruit [Normal S1, S2] : normal S1, S2 [No Murmur] : no murmur [No Gallop] : no gallop [Clear Lung Fields] : clear lung fields [Good Air Entry] : good air entry [No Respiratory Distress] : no respiratory distress  [Soft] : abdomen soft [Non Tender] : non-tender [No Masses/organomegaly] : no masses/organomegaly [Normal Bowel Sounds] : normal bowel sounds [Normal Gait] : normal gait [No Edema] : no edema [No Cyanosis] : no cyanosis [No Varicosities] : no varicosities [No Rash] : no rash [No Skin Lesions] : no skin lesions [Moves all extremities] : moves all extremities [No Focal Deficits] : no focal deficits [Normal Speech] : normal speech [Alert and Oriented] : alert and oriented [Normal memory] : normal memory

## 2021-07-25 ENCOUNTER — RESULT CHARGE (OUTPATIENT)
Age: 71
End: 2021-07-25

## 2021-07-26 NOTE — HISTORY OF PRESENT ILLNESS
[FreeTextEntry1] : 70 year old male with PMH of prediabetes, coronary disease S/P CABG in [place] in 2012, s/p multiple PCIs- last PCI 12/2019, HLD, HTN, and hyperthyroidism. Patient presents for routine follow up. He states he is feeling well. Denies CP, SOB, JURADO, edema, palpitations, near syncope or syncope. He exercises routinely, 1-1.5 hours 3x/week, denies chest pain or SOB. He also changed his diet and no longer eats red meat. TTE from 11/2019 LVEF 46%, mild-mod MR, and moderate AS, and apical inferior hypokinesis. Subsequent LHC in 12/2019 with LIMA to LAD dz s/p Omar MARCELINA x2. He then had a stress test in Feb 2021 which was abnormal and had a subsequent cardiac cath with no changes.  He was started on Entresto and he feels much improved.  He is back to the gym and doing well.  He still gets rare pinches of chest pain which last a brief moment.  Despite having Covid, he feels well with no dyspnea. Thyroid has been stable.

## 2021-07-26 NOTE — CARDIOLOGY SUMMARY
[de-identified] : 7/9/2021:  Sinus  Bradycardia with sinus arrhythmia\par Low voltage in limb leads. \par Nonspecific T-abnormality.

## 2021-10-07 ENCOUNTER — APPOINTMENT (OUTPATIENT)
Dept: DERMATOLOGY | Facility: CLINIC | Age: 71
End: 2021-10-07

## 2021-10-21 ENCOUNTER — LABORATORY RESULT (OUTPATIENT)
Age: 71
End: 2021-10-21

## 2021-10-28 ENCOUNTER — APPOINTMENT (OUTPATIENT)
Dept: ENDOCRINOLOGY | Facility: CLINIC | Age: 71
End: 2021-10-28
Payer: MEDICARE

## 2021-10-28 VITALS
WEIGHT: 229 LBS | BODY MASS INDEX: 31.02 KG/M2 | HEIGHT: 72 IN | DIASTOLIC BLOOD PRESSURE: 80 MMHG | HEART RATE: 63 BPM | OXYGEN SATURATION: 96 % | SYSTOLIC BLOOD PRESSURE: 125 MMHG

## 2021-10-28 DIAGNOSIS — E05.90 THYROTOXICOSIS, UNSPECIFIED W/OUT THYROTOXIC CRISIS OR STORM: ICD-10-CM

## 2021-10-28 DIAGNOSIS — R73.03 PREDIABETES.: ICD-10-CM

## 2021-10-28 DIAGNOSIS — E04.1 NONTOXIC SINGLE THYROID NODULE: ICD-10-CM

## 2021-10-28 DIAGNOSIS — E53.8 DEFICIENCY OF OTHER SPECIFIED B GROUP VITAMINS: ICD-10-CM

## 2021-10-28 DIAGNOSIS — E55.9 VITAMIN D DEFICIENCY, UNSPECIFIED: ICD-10-CM

## 2021-10-28 PROCEDURE — 99214 OFFICE O/P EST MOD 30 MIN: CPT

## 2021-10-28 RX ORDER — PREDNISOLONE ACETATE 10 MG/ML
1 SUSPENSION/ DROPS OPHTHALMIC
Qty: 5 | Refills: 0 | Status: DISCONTINUED | COMMUNITY
Start: 2021-05-03 | End: 2021-10-28

## 2021-10-28 RX ORDER — OFLOXACIN 3 MG/ML
0.3 SOLUTION/ DROPS OPHTHALMIC
Qty: 10 | Refills: 0 | Status: DISCONTINUED | COMMUNITY
Start: 2021-05-03 | End: 2021-10-28

## 2021-10-28 NOTE — REASON FOR VISIT
[Follow - Up] : a follow-up visit [Hyperthyroidism] : hyperthyroidism [Thyroid nodule/ MNG] : thyroid nodule/ MNG [Other___] : [unfilled]

## 2021-10-28 NOTE — HISTORY OF PRESENT ILLNESS
[FreeTextEntry1] : Quality: Pre-DM \par Modifying Factors: Diet Control\par \par SMBG\par does not check \par \par HgbA1C: 6.0%\par \par Current Regimen: not on medication \par \par Kidney Disease: none \par Heart Disease: open heart surgery 2013, goes to cardiologist regularly, 5 stents and quadruple bypass\par \par Weight: stable \par Diet: tried to watch, but eats cookies at night \par Exercise: 3x's a week goes to the gym \par Smoking: none\par \par Hyperthyroidism\par TSH 4.35, FT4 1.2\par MMI 5 mg QD\par \par Thyroid Nodule\par -FNA in past, due in 12/2021\par \par Vit B12 Deficiency\par -Vit B12 452\par -MVI\par \par Vit  D Deficiency \par Vit D 54.4\par -Liquid Vit D

## 2021-10-28 NOTE — REVIEW OF SYSTEMS
[Fatigue] : fatigue [Visual Field Defect] : no visual field defect [Blurred Vision] : no blurred vision [Dysphagia] : no dysphagia [Neck Pain] : no neck pain [Chest Pain] : no chest pain [Palpitations] : no palpitations [Shortness Of Breath] : no shortness of breath [Nausea] : no nausea [Constipation] : no constipation [Vomiting] : no vomiting [Diarrhea] : no diarrhea [Polyuria] : no polyuria [Polydipsia] : no polydipsia

## 2021-10-28 NOTE — ASSESSMENT
[FreeTextEntry1] : Pre-DM\par -Reviewed risk/complication of uncontrolled DM \par -Increase exercise as tolerated 5 days a week x 30 mins/day\par -Increase dietary efforts, low carb/low sugar\par -Repeat HgbA1C prior to next appt \par \par Hyperthyroidism\par -Decrease MMI 5 mg QOD\par \par Thyroid Nodules\par -Repeat Thyroid u/s 12/2021\par \par Vit B12 Deficiency\par -continue multi-vitamin\par \par Vit D Deficiency\par -Continue Vit D supplement\par \par \par RTO in 4 months Dr. Orozco

## 2021-11-09 ENCOUNTER — MED ADMIN CHARGE (OUTPATIENT)
Age: 71
End: 2021-11-09

## 2021-11-09 ENCOUNTER — APPOINTMENT (OUTPATIENT)
Dept: CARDIOLOGY | Facility: CLINIC | Age: 71
End: 2021-11-09
Payer: MEDICARE

## 2021-11-09 PROCEDURE — 93306 TTE W/DOPPLER COMPLETE: CPT

## 2021-11-09 RX ORDER — PERFLUTREN 6.52 MG/ML
6.52 INJECTION, SUSPENSION INTRAVENOUS
Qty: 2 | Refills: 0 | Status: COMPLETED | OUTPATIENT
Start: 2021-11-09

## 2021-11-09 RX ADMIN — PERFLUTREN MG/ML: 6.52 INJECTION, SUSPENSION INTRAVENOUS at 00:00

## 2021-11-19 ENCOUNTER — NON-APPOINTMENT (OUTPATIENT)
Age: 71
End: 2021-11-19

## 2021-11-20 RX ORDER — CHLORHEXIDINE GLUCONATE 213 G/1000ML
1 SOLUTION TOPICAL ONCE
Refills: 0 | Status: DISCONTINUED | OUTPATIENT
Start: 2021-11-22 | End: 2021-12-06

## 2021-11-20 NOTE — H&P PST ADULT - ASSESSMENT
Impression:      Plan:  -plan for R/LHC via RA/BV vs FA/FV  -patient seen and examined  -confirmed appropriate NPO duration  -ECG and Labs reviewed  -Aspirin 81mg po pre-cath  -procedure discussed with patient; risks and benefits explained, questions answered  -consent obtained by attending IC   Impression:  71yo male with extensive h/o CAD with prior 4V CABG and multiple PCIs, also with mod-sev AS and HFrEF 35-40%, now with class III anginal symptoms and plan for R/LHC to be performed by Dr. Michael.     Plan:  -plan for R/LHC via RA/BV vs FA/FV  -patient seen and examined  -confirmed appropriate NPO duration  -ECG and Labs reviewed  -Aspirin 81mg and Plavix 74mg po pre-cath  -procedure discussed with patient; risks and benefits explained, questions answered  -consent obtained by attending IC

## 2021-11-20 NOTE — H&P PST ADULT - HISTORY OF PRESENT ILLNESS
Department of Cardiology                                                                  [facility]/[address] [place] [id number]                                                            Telephone: [phone]. Fax:[phone]                                                                             Pre- Procedure H + P/Progress Note      HPI:  71 yo male with h/o Htn, HLD, pre-diabetes, hyperthyroidism, CAD with prior 4V CABG in [place] in 2013, s/p multiple PCIs (last PCI 12/2019 s/p MARCELINA x 2 to distal LIMA and distal LAD), HFrEF, Grade I diastolic dysfunction, mod AS, mild to mod MR, presented to the ED 2/24/21 with c/o anginal symptoms, TTE with reduced LVEF 35-40% (EF was 40-45% in 2016), underwent cath with overall unchanged CAD (Patent LIMA to LAD, SVG to D1 and SVG to RPDA, known occluded SVG to OM2 and severe native LCx disease (med management) and PIS LMCA/LCx, OM1, distal LIMA/dLAD, now c/o JURADO similar to previous angina, recently seen by Dr. Oviedo, TTE 11/9/21 with LVEF 35-40%, Grade I diastolic dysfunction, mild MR, worsening LF/LG mod-sev AS with P/MG 41/24mmHg (previously 26/15mm on 2/24/21), PV 3.2m/s, DUNCAN 1.1cm2 (BSA 2.26), DI 0.24(c/w severe AS), now referred for R/LHC to eval hemodynamics, AS severity, and possible CAD progression/ISR/graft occlusion, to be performed by Dr. Michael.         Symptoms:        Angina (Class): III       Ischemic Symptoms: JURADO Angina Equivalent    Heart Failure:        Systolic/Diastolic/Combined: combined       NYHA Class (within 2 weeks): III    Assessment of LVEF:       EF: 35-40%       Assessed by: TTE       Date: 2/24/21    Prior Cardiac Interventions:  C 2/24/21:  Patent LIMA to LAD, SVG to D1 and SVG to RPDA, known occluded SVG to OM2 and severe native LCx disease (med management) and PIS LMCA/LCx, OM1, distal LIMA/dLAD,    PCI 12/10/2019:  A percutaneous intervention was performed on the 80 % lesion in the distal third of the left internal mammary graft from the aorta to the distal LAD.  LAUREN 2.75 X 12MM, LAUREN 2.50 X 18MM distal LIMA graft to dLAD    PCI 4/7/16:  INTERVENTIONAL IMPRESSIONS: Severe multivessel disease. Patent LIMA to LAD,  SVG to Diagonal and SVG to RCA. Occluded SVG to circumflex. New distal OM  disease- PCI with 1 MARCELINA (2.5 X 12 RESOLUTE). New left main disease (by IVUS) - PCI with 1 MARCELINA (4.0 X 15 RESOLUTE).    Noninvasive Testing:   not recent     Echo 11/9/21:  LVEF 35-40%, apical infe dyskinetic, basal and mid inferolateral wall akinetic, basal and mid infe wall hypokinetic, Grade I diastolic dysfunction, mild MR, LF/LG mod-sev AS with P/MG 41/24mmHg, PV 3.2m/s, DUNCAN 1.1cm2 (BSA 2.26), DI 0.24(c/w severe AS)     Echo 2/24/21:   1. Left ventricular ejection fraction, by visual estimation, is 35 to 40%.   2. Basal and mid inferolateral wall and basal and mid anterolateral wall are abnormal as described above.   3. Spectral Doppler shows impaired relaxation pattern of left ventricular myocardial filling (Grade I diastolic dysfunction).   4. Mildly enlarged left atrium.   5. Normal right atrial size.   6. Mild to moderate mitral valve regurgitation.   7. Thickening of the anterior and posterior mitral valve leaflets.   8. Moderate aortic valve stenosis.   9. Peak transaortic gradient equals 26.3 mmHg, mean transaortic gradient equals 15.3 mmHg, the calculated aortic valve area equals 1.12 cm² by the continuity equation consistent with moderate aortic stenosis.  10. The Dimesionless Index value is 0.25.  11. Compared to prior imaging on 4/1/2016, left ventricular function is unchanged, and there is moderate aortic stenosis with valve area of 1.12 cm^2.        Risk Assessments:  ASA:  Mallampati:  Bleeding Risk:  Creatinine:  GFR:    Associated Risk Factors:        Cerebrovascular Disease: N/A       Chronic Lung Disease: N/A       Peripheral Arterial Disease: N/A       Chronic Kidney Disease (if yes, what is GFR): N/A       Uncontrolled Diabetes (if yes, what is HgbA1C or FBS): N/A       Poorly Controlled Hypertension (if yes, what is SBP): N/A       Morbid Obesity (if yes, what is BMI): N/A       History of Recent Ventricular Arrhythmia: N/A       Inability to Ambulate Safely: N/A       Need for Therapeutic Anticoagulation: N/A       Antiplatelet or Contrast Allergy: N/A    Antianginal Therapies:        Beta Blockers:  Coreg       Calcium Channel Blockers:        Long Acting Nitrates:        Ranexa:     TELEMETRY: 	      ECG:  	                                                                           Department of Cardiology                                                                  [facility]/[place] [address] [place] [id number]                                                            Telephone: [phone]. Fax:[phone]                                                                             Pre- Procedure H + P/Progress Note      HPI:  71 yo male with h/o Htn, HLD, pre-diabetes, hyperthyroidism, CAD with prior 4V CABG in [place] in 2013, s/p multiple PCIs (last PCI 12/2019 s/p MARCELINA x 2 to distal LIMA and distal LAD), HFrEF, Grade I diastolic dysfunction, mod AS, mild to mod MR, presented to the ED 2/24/21 with c/o anginal symptoms, TTE with reduced LVEF 35-40% (EF was 40-45% in 2016), underwent cath with overall unchanged CAD (Patent LIMA to LAD, SVG to D1 and SVG to RPDA, known occluded SVG to OM2 and severe native LCx disease (med management) and PIS LMCA/LCx, OM1, distal LIMA/dLAD, now c/o exertional left-sided chest pressure and JURADO similar to previous angina, recently seen by Dr. Oviedo, TTE 11/9/21 with LVEF 35-40%, Grade I diastolic dysfunction, mild MR, worsening LF/LG mod-sev AS with P/MG 41/24mmHg (previously 26/15mm on 2/24/21), PV 3.2m/s, DUNCAN 1.1cm2 (BSA 2.26), DI 0.24(c/w severe AS), now referred for R/LHC to eval hemodynamics, AS severity, and possible CAD progression/ISR/graft occlusion, to be performed by Dr. Michael.         Symptoms:        Angina (Class): III       Ischemic Symptoms: exertional chest pressure and JURADO     Heart Failure:        Systolic/Diastolic/Combined: combined       NYHA Class (within 2 weeks): III    Assessment of LVEF:       EF: 35-40%       Assessed by: TTE       Date: 2/24/21    Prior Cardiac Interventions:  LHC 2/24/21:  Patent LIMA to LAD, SVG to D1 and SVG to RPDA, known occluded SVG to OM2 and severe native LCx disease (med management) and PIS LMCA/LCx, OM1, distal LIMA/dLAD,    PCI 12/10/2019:  A percutaneous intervention was performed on the 80 % lesion in the distal third of the left internal mammary graft from the aorta to the distal LAD.  LAUREN 2.75 X 12MM, LAUREN 2.50 X 18MM distal LIMA graft to dLAD    PCI 4/7/16:  INTERVENTIONAL IMPRESSIONS: Severe multivessel disease. Patent LIMA to LAD,  SVG to Diagonal and SVG to RCA. Occluded SVG to circumflex. New distal OM  disease- PCI with 1 MARCELINA (2.5 X 12 RESOLUTE). New left main disease (by IVUS) - PCI with 1 MARCELINA (4.0 X 15 RESOLUTE).    Noninvasive Testing:   not recent     Echo 11/9/21:  LVEF 35-40%, apical infe dyskinetic, basal and mid inferolateral wall akinetic, basal and mid infe wall hypokinetic, Grade I diastolic dysfunction, mild MR, LF/LG mod-sev AS with P/MG 41/24mmHg, PV 3.2m/s, DUNCAN 1.1cm2 (BSA 2.26), DI 0.24(c/w severe AS)     Echo 2/24/21:   1. Left ventricular ejection fraction, by visual estimation, is 35 to 40%.   2. Basal and mid inferolateral wall and basal and mid anterolateral wall are abnormal as described above.   3. Spectral Doppler shows impaired relaxation pattern of left ventricular myocardial filling (Grade I diastolic dysfunction).   4. Mildly enlarged left atrium.   5. Normal right atrial size.   6. Mild to moderate mitral valve regurgitation.   7. Thickening of the anterior and posterior mitral valve leaflets.   8. Moderate aortic valve stenosis.   9. Peak transaortic gradient equals 26.3 mmHg, mean transaortic gradient equals 15.3 mmHg, the calculated aortic valve area equals 1.12 cm² by the continuity equation consistent with moderate aortic stenosis.  10. The Dimesionless Index value is 0.25.  11. Compared to prior imaging on 4/1/2016, left ventricular function is unchanged, and there is moderate aortic stenosis with valve area of 1.12 cm^2.        Risk Assessments:  ASA: 3  Mallampati: 2  Bleeding Risk: 0.7%  Creatinine: 0.9  GFR: 82    Associated Risk Factors:        Cerebrovascular Disease: N/A       Chronic Lung Disease: N/A       Peripheral Arterial Disease: N/A       Chronic Kidney Disease (if yes, what is GFR): N/A       Uncontrolled Diabetes (if yes, what is HgbA1C or FBS): N/A       Poorly Controlled Hypertension (if yes, what is SBP): N/A       Morbid Obesity (if yes, what is BMI): N/A       History of Recent Ventricular Arrhythmia: N/A       Inability to Ambulate Safely: N/A       Need for Therapeutic Anticoagulation: N/A       Antiplatelet or Contrast Allergy: N/A    Antianginal Therapies:        Beta Blockers:  Coreg       Calcium Channel Blockers:        Long Acting Nitrates:        Ranexa:     TELEMETRY: 	      ECG:

## 2021-11-20 NOTE — H&P PST ADULT - REASON FOR ADMISSION
Progressive JURADO, known AS and h/o CAD, for R/LHC Exertional left-sided chest pressure and JURADO, known AS and h/o CAD, for R/LHC

## 2021-11-22 ENCOUNTER — OUTPATIENT (OUTPATIENT)
Dept: OUTPATIENT SERVICES | Facility: HOSPITAL | Age: 71
LOS: 1 days | Discharge: HOME | End: 2021-11-22
Payer: MEDICARE

## 2021-11-22 ENCOUNTER — TRANSCRIPTION ENCOUNTER (OUTPATIENT)
Age: 71
End: 2021-11-22

## 2021-11-22 VITALS
TEMPERATURE: 98 F | HEART RATE: 54 BPM | RESPIRATION RATE: 16 BRPM | SYSTOLIC BLOOD PRESSURE: 145 MMHG | DIASTOLIC BLOOD PRESSURE: 68 MMHG | OXYGEN SATURATION: 98 %

## 2021-11-22 VITALS
HEART RATE: 61 BPM | OXYGEN SATURATION: 98 % | RESPIRATION RATE: 15 BRPM | DIASTOLIC BLOOD PRESSURE: 67 MMHG | SYSTOLIC BLOOD PRESSURE: 127 MMHG

## 2021-11-22 DIAGNOSIS — Z95.5 PRESENCE OF CORONARY ANGIOPLASTY IMPLANT AND GRAFT: Chronic | ICD-10-CM

## 2021-11-22 DIAGNOSIS — Z98.89 OTHER SPECIFIED POSTPROCEDURAL STATES: Chronic | ICD-10-CM

## 2021-11-22 DIAGNOSIS — Z95.1 PRESENCE OF AORTOCORONARY BYPASS GRAFT: Chronic | ICD-10-CM

## 2021-11-22 DIAGNOSIS — Z90.49 ACQUIRED ABSENCE OF OTHER SPECIFIED PARTS OF DIGESTIVE TRACT: Chronic | ICD-10-CM

## 2021-11-22 DIAGNOSIS — I25.10 ATHEROSCLEROTIC HEART DISEASE OF NATIVE CORONARY ARTERY WITHOUT ANGINA PECTORIS: ICD-10-CM

## 2021-11-22 LAB
A1C WITH ESTIMATED AVERAGE GLUCOSE RESULT: 5.7 % — HIGH (ref 4–5.6)
ALBUMIN SERPL ELPH-MCNC: 4.5 G/DL — SIGNIFICANT CHANGE UP (ref 3.3–5.2)
ALP SERPL-CCNC: 88 U/L — SIGNIFICANT CHANGE UP (ref 40–120)
ALT FLD-CCNC: 14 U/L — SIGNIFICANT CHANGE UP
ANION GAP SERPL CALC-SCNC: 10 MMOL/L — SIGNIFICANT CHANGE UP (ref 5–17)
AST SERPL-CCNC: 17 U/L — SIGNIFICANT CHANGE UP
BASOPHILS # BLD AUTO: 0.02 K/UL — SIGNIFICANT CHANGE UP (ref 0–0.2)
BASOPHILS NFR BLD AUTO: 0.4 % — SIGNIFICANT CHANGE UP (ref 0–2)
BILIRUB SERPL-MCNC: 0.4 MG/DL — SIGNIFICANT CHANGE UP (ref 0.4–2)
BUN SERPL-MCNC: 17.6 MG/DL — SIGNIFICANT CHANGE UP (ref 8–20)
CALCIUM SERPL-MCNC: 9.3 MG/DL — SIGNIFICANT CHANGE UP (ref 8.6–10.2)
CHLORIDE SERPL-SCNC: 102 MMOL/L — SIGNIFICANT CHANGE UP (ref 98–107)
CHOLEST SERPL-MCNC: 158 MG/DL — SIGNIFICANT CHANGE UP
CO2 SERPL-SCNC: 28 MMOL/L — SIGNIFICANT CHANGE UP (ref 22–29)
CREAT SERPL-MCNC: 0.93 MG/DL — SIGNIFICANT CHANGE UP (ref 0.5–1.3)
EOSINOPHIL # BLD AUTO: 0.19 K/UL — SIGNIFICANT CHANGE UP (ref 0–0.5)
EOSINOPHIL NFR BLD AUTO: 3.3 % — SIGNIFICANT CHANGE UP (ref 0–6)
ESTIMATED AVERAGE GLUCOSE: 117 MG/DL — HIGH (ref 68–114)
GLUCOSE SERPL-MCNC: 120 MG/DL — HIGH (ref 70–99)
HCT VFR BLD CALC: 42 % — SIGNIFICANT CHANGE UP (ref 39–50)
HDLC SERPL-MCNC: 56 MG/DL — SIGNIFICANT CHANGE UP
HGB BLD-MCNC: 14 G/DL — SIGNIFICANT CHANGE UP (ref 13–17)
IMM GRANULOCYTES NFR BLD AUTO: 0.4 % — SIGNIFICANT CHANGE UP (ref 0–1.5)
LIPID PNL WITH DIRECT LDL SERPL: 83 MG/DL — SIGNIFICANT CHANGE UP
LYMPHOCYTES # BLD AUTO: 1.04 K/UL — SIGNIFICANT CHANGE UP (ref 1–3.3)
LYMPHOCYTES # BLD AUTO: 18.2 % — SIGNIFICANT CHANGE UP (ref 13–44)
MAGNESIUM SERPL-MCNC: 2.1 MG/DL — SIGNIFICANT CHANGE UP (ref 1.6–2.6)
MCHC RBC-ENTMCNC: 28.7 PG — SIGNIFICANT CHANGE UP (ref 27–34)
MCHC RBC-ENTMCNC: 33.3 GM/DL — SIGNIFICANT CHANGE UP (ref 32–36)
MCV RBC AUTO: 86.1 FL — SIGNIFICANT CHANGE UP (ref 80–100)
MONOCYTES # BLD AUTO: 0.36 K/UL — SIGNIFICANT CHANGE UP (ref 0–0.9)
MONOCYTES NFR BLD AUTO: 6.3 % — SIGNIFICANT CHANGE UP (ref 2–14)
NEUTROPHILS # BLD AUTO: 4.08 K/UL — SIGNIFICANT CHANGE UP (ref 1.8–7.4)
NEUTROPHILS NFR BLD AUTO: 71.4 % — SIGNIFICANT CHANGE UP (ref 43–77)
NON HDL CHOLESTEROL: 102 MG/DL — SIGNIFICANT CHANGE UP
NT-PROBNP SERPL-SCNC: 70 PG/ML — SIGNIFICANT CHANGE UP (ref 0–300)
PLATELET # BLD AUTO: 272 K/UL — SIGNIFICANT CHANGE UP (ref 150–400)
POTASSIUM SERPL-MCNC: 4.2 MMOL/L — SIGNIFICANT CHANGE UP (ref 3.5–5.3)
POTASSIUM SERPL-SCNC: 4.2 MMOL/L — SIGNIFICANT CHANGE UP (ref 3.5–5.3)
PROT SERPL-MCNC: 7.3 G/DL — SIGNIFICANT CHANGE UP (ref 6.6–8.7)
RBC # BLD: 4.88 M/UL — SIGNIFICANT CHANGE UP (ref 4.2–5.8)
RBC # FLD: 13.5 % — SIGNIFICANT CHANGE UP (ref 10.3–14.5)
SARS-COV-2 RNA SPEC QL NAA+PROBE: SIGNIFICANT CHANGE UP
SODIUM SERPL-SCNC: 140 MMOL/L — SIGNIFICANT CHANGE UP (ref 135–145)
TRIGL SERPL-MCNC: 95 MG/DL — SIGNIFICANT CHANGE UP
WBC # BLD: 5.71 K/UL — SIGNIFICANT CHANGE UP (ref 3.8–10.5)
WBC # FLD AUTO: 5.71 K/UL — SIGNIFICANT CHANGE UP (ref 3.8–10.5)

## 2021-11-22 PROCEDURE — 80061 LIPID PANEL: CPT

## 2021-11-22 PROCEDURE — 93461 R&L HRT ART/VENTRICLE ANGIO: CPT

## 2021-11-22 PROCEDURE — U0005: CPT

## 2021-11-22 PROCEDURE — C1894: CPT

## 2021-11-22 PROCEDURE — 85025 COMPLETE CBC W/AUTO DIFF WBC: CPT

## 2021-11-22 PROCEDURE — 99152 MOD SED SAME PHYS/QHP 5/>YRS: CPT

## 2021-11-22 PROCEDURE — C1887: CPT

## 2021-11-22 PROCEDURE — C1769: CPT

## 2021-11-22 PROCEDURE — C1889: CPT

## 2021-11-22 PROCEDURE — 80053 COMPREHEN METABOLIC PANEL: CPT

## 2021-11-22 PROCEDURE — 99153 MOD SED SAME PHYS/QHP EA: CPT

## 2021-11-22 PROCEDURE — 36415 COLL VENOUS BLD VENIPUNCTURE: CPT

## 2021-11-22 PROCEDURE — 93461 R&L HRT ART/VENTRICLE ANGIO: CPT | Mod: 26

## 2021-11-22 PROCEDURE — 83036 HEMOGLOBIN GLYCOSYLATED A1C: CPT

## 2021-11-22 PROCEDURE — U0003: CPT

## 2021-11-22 PROCEDURE — 93005 ELECTROCARDIOGRAM TRACING: CPT

## 2021-11-22 PROCEDURE — 83880 ASSAY OF NATRIURETIC PEPTIDE: CPT

## 2021-11-22 PROCEDURE — 83735 ASSAY OF MAGNESIUM: CPT

## 2021-11-22 PROCEDURE — 93010 ELECTROCARDIOGRAM REPORT: CPT

## 2021-11-22 RX ORDER — CARVEDILOL PHOSPHATE 80 MG/1
1 CAPSULE, EXTENDED RELEASE ORAL
Qty: 0 | Refills: 3 | DISCHARGE

## 2021-11-22 RX ORDER — LOSARTAN POTASSIUM 100 MG/1
1 TABLET, FILM COATED ORAL
Qty: 0 | Refills: 0 | DISCHARGE

## 2021-11-22 RX ORDER — CLOPIDOGREL BISULFATE 75 MG/1
75 TABLET, FILM COATED ORAL ONCE
Refills: 0 | Status: COMPLETED | OUTPATIENT
Start: 2021-11-22 | End: 2021-11-22

## 2021-11-22 RX ORDER — ASPIRIN/CALCIUM CARB/MAGNESIUM 324 MG
81 TABLET ORAL DAILY
Refills: 0 | Status: DISCONTINUED | OUTPATIENT
Start: 2021-11-22 | End: 2021-12-06

## 2021-11-22 RX ORDER — METHIMAZOLE 10 MG/1
1 TABLET ORAL
Qty: 0 | Refills: 0 | DISCHARGE

## 2021-11-22 RX ORDER — FINASTERIDE 5 MG/1
0.5 TABLET, FILM COATED ORAL
Qty: 0 | Refills: 0 | DISCHARGE

## 2021-11-22 RX ORDER — SACUBITRIL AND VALSARTAN 24; 26 MG/1; MG/1
1 TABLET, FILM COATED ORAL
Qty: 0 | Refills: 0 | DISCHARGE

## 2021-11-22 RX ORDER — ESCITALOPRAM OXALATE 10 MG/1
1 TABLET, FILM COATED ORAL
Qty: 0 | Refills: 0 | DISCHARGE

## 2021-11-22 RX ORDER — ALLOPURINOL 300 MG
1 TABLET ORAL
Qty: 0 | Refills: 0 | DISCHARGE

## 2021-11-22 RX ORDER — ROSUVASTATIN CALCIUM 5 MG/1
1 TABLET ORAL
Qty: 0 | Refills: 0 | DISCHARGE

## 2021-11-22 RX ADMIN — Medication 81 MILLIGRAM(S): at 10:50

## 2021-11-22 RX ADMIN — CLOPIDOGREL BISULFATE 75 MILLIGRAM(S): 75 TABLET, FILM COATED ORAL at 10:51

## 2021-11-22 NOTE — DISCHARGE NOTE PROVIDER - NSDCFUADDINST_GEN_ALL_CORE_FT
Go to the ED with any acute onset of chest pain, palpitations, shortness of breath or dizziness.   Managing risk factors will help prevent future blockages, risk factors may include: high blood pressure, high cholesterol, obesity, sedentary life style and smoking.    Your diet should be low in fat, cholesterol, salt and carbohydrates, increase fruits (caution if diabetic), vegetables and whole grains/fiber rich foods.   Take all your cardiac  medications as prescribed.    No heavy lifting, driving, sex, tub baths, swimming, or any activity that submerges the lower half of the body in water for 48 hours.  Limited walking and stairs for 48 hours.    Change the bandaid after 24 hours and every 24 hours after that.  Keep the puncture site dry and covered with a bandaid until a scab forms.    Observe the site frequently.  If bleeding or a large lump (the size of a golf ball or bigger) occurs lie flat, apply continuous direct pressure just above the puncture site for at least 10 minutes, and notify your physician immediately.  If the bleeding cannot be controlled, call 911 immediately for assistance.  Notify your physician of pain, swelling or any drainage.    Notify your physician immediately if coldness, numbness, discoloration or pain in your foot occurs.      Exercise is a very important factor in heart health. Once your post procedure restrictions have passed, you should engage in heart healthy, aerobic exercise. Be sure to have clearance from your cardiologist. Cardiac rehab programs could be extremely beneficial and your cardiologist could help set this up.   Follow up with your cardiologist within 1-2 weeks after your procedure.   Call your cardiologist or our unit ([phone]) with any questions or concerns that may arise.

## 2021-11-22 NOTE — DISCHARGE NOTE PROVIDER - NSDCCPTREATMENT_GEN_ALL_CORE_FT
PRINCIPAL PROCEDURE  Procedure: Left and right heart catheterization  Findings and Treatment: Go to the ED with any acute onset of chest pain, palpitations, shortness of breath or dizziness.   Managing risk factors will help prevent future blockages, risk factors may include: high blood pressure, high cholesterol, obesity, sedentary life style and smoking.    Your diet should be low in fat, cholesterol, salt and carbohydrates, increase fruits (caution if diabetic), vegetables and whole grains/fiber rich foods.   Take all your cardiac  medications as prescribed.    No heavy lifting, driving, sex, tub baths, swimming, or any activity that submerges the lower half of the body in water for 48 hours.  Limited walking and stairs for 48 hours.    Change the bandaid after 24 hours and every 24 hours after that.  Keep the puncture site dry and covered with a bandaid until a scab forms.    Observe the site frequently.  If bleeding or a large lump (the size of a golf ball or bigger) occurs lie flat, apply continuous direct pressure just above the puncture site for at least 10 minutes, and notify your physician immediately.  If the bleeding cannot be controlled, call 911 immediately for assistance.  Notify your physician of pain, swelling or any drainage.    Notify your physician immediately if coldness, numbness, discoloration or pain in your foot occurs.  Exercise is a very important factor in heart health. Once your post procedure restrictions have passed, you should engage in heart healthy, aerobic exercise. Be sure to have clearance from your cardiologist. Cardiac rehab programs could be extremely beneficial and your cardiologist could help set this up.   Follow up with your cardiologist within 1-2 weeks after your procedure.   Call your cardiologist or our unit ([phone]) with any questions or concerns that may arise.

## 2021-11-22 NOTE — DISCHARGE NOTE PROVIDER - NSDCCPCAREPLAN_GEN_ALL_CORE_FT
PRINCIPAL DISCHARGE DIAGNOSIS  Diagnosis: CAD (coronary artery disease)  Assessment and Plan of Treatment: The cardiac catheterization has ruled out significant progression or change in you coronary artery disease as the cause of your symptoms. You do have some non-significant disease and should cont to take your medications as prescribed and manage risk factors such as high cholesterol, high blood pressure, smoking, diabetes, obesity and sedentary life style, and follow a heart healthy diet and exercise. This will prevent future significant coronary disease. You should follow up with your doctor to further investigate what the cause of your symptoms may be.      SECONDARY DISCHARGE DIAGNOSES  Diagnosis: HTN (hypertension)  Assessment and Plan of Treatment: Continue to take antihypertensive medications as prescribed. Obtain a home blood pressure monitor (at any pharmacy or medical supply store) and monitor blood pressure trends. Call your doctor if you note you blood pressure to be running much higher or lower than usual. Limit salt intake.    Diagnosis: HLD (hyperlipidemia)  Assessment and Plan of Treatment: Your diet should be low in fat, cholesterol, salt and carbohydrates, increase fruits (caution if diabetic), vegetables and whole grains/fiber rich foods. Take your cholesterol lowering medications as prescribed. Routine follow up lipid panels    Diagnosis: Pre-diabetes  Assessment and Plan of Treatment: your HbA1C is 5.7, Continue to follow a heart healthy diet and limit carb and sugar intake. Cont to have routine f/u and HbA1C levels monitored.    Diagnosis: Chronic systolic congestive heart failure  Assessment and Plan of Treatment: Take your medications as prescribed. Monitor for symptoms of heart failure: increasing shortness of breath, inability to lie flat, swelling in your legs, increased abdomiinal girth or bloating, any significant increase in weight, increasing dizziness or fatigue,    Diagnosis: Aortic stenosis  Assessment and Plan of Treatment: Your aortic stenosis is currently moderate. Be sure to have close follow up with your cardiologist and routine echocardiograms to monitor any progression. Monitor for symptoms of heart failure: increasing shortness of breath, inability to lie flat, swelling in your legs, increased abdomiinal girth or bloating, any significant increase in weight, increasing dizziness or fatigue. Report any of the above symtpoms to Dr. Oviedo, as this could mean your aortic stenosis has progressed.

## 2021-11-22 NOTE — DISCHARGE NOTE PROVIDER - HOSPITAL COURSE
HPI:  69 yo male with h/o Htn, HLD, pre-diabetes, hyperthyroidism, CAD with prior 4V CABG in [place] in 2013, s/p multiple PCIs (last PCI 12/2019 s/p MARCELINA x 2 to distal LIMA and distal LAD), HFrEF, Grade I diastolic dysfunction, mod AS, mild to mod MR, presented to the ED 2/24/21 with c/o anginal symptoms, TTE with reduced LVEF 35-40% (EF was 40-45% in 2016), underwent cath with overall unchanged CAD (Patent LIMA to LAD, SVG to D1 and SVG to RPDA, known occluded SVG to OM2 and severe native LCx disease (med management) and PIS LMCA/LCx, OM1, distal LIMA/dLAD, now c/o exertional left-sided chest pressure and JURADO similar to previous angina, recently seen by Dr. Oviedo, TTE 11/9/21 with LVEF 35-40%, Grade I diastolic dysfunction, mild MR, worsening LF/LG mod-sev AS with P/MG 41/24mmHg (previously 26/15mm on 2/24/21), PV 3.2m/s, DUNCAN 1.1cm2 (BSA 2.26), DI 0.24(c/w severe AS), now referred for R/LHC to eval hemodynamics, AS severity, and possible CAD progression/ISR/graft occlusion, to be performed by Dr. Michael.       Plan:  -plan for R/LHC via RA/BV vs FA/FV  -patient seen and examined  -confirmed appropriate NPO duration  -ECG and Labs reviewed  -Aspirin 81mg and Plavix 74mg po pre-cath  -procedure discussed with patient; risks and benefits explained, questions answered  -consent obtained by attending IC    Now s/p R/LHC via LFA/LFV with no significant change/progression of CAD, mod AS with P-P gradient 18mmHg, PCW 15, PA 34/12 with mean 20mmHg, CO/CI 6.7/3, procedure performed by Dr. Michael, recieved  IV sedation intraprocedurally, arrived to recovery in NAD and HDS, LFA/LFV access site stable, no bleed/hematoma, distal pulse +, discharge home today after recovery period completed.      Plan:  -Formal cath report pending  -Post procedure management/monitoring per protocol  -Access site precautions  -LFA/V sheath removal at 1400  -Bedrest x 3hours post sheath removal and achievement of hemostasis  -Continue current medical therapy  -Cont anti platelet therapy with daily plavix   -Cont BB with Coreg 6.25mg po q12hr  -Cont Entresto 97/103 po daily  -Cont statin therapy with Crestor 20mg po daily  -Educated regarding strict adherence with meds/plavix   -Educated regarding post procedure management and care  -Discussed the importance of RF modification  -F/U outpt in 1-2 weeks with Cardiologist Dr. Oviedo  -DISPO:  Plan for D/C after recovery period completed

## 2021-11-22 NOTE — DISCHARGE NOTE PROVIDER - CARE PROVIDER_API CALL
Edison Oviedo)  Cardiovascular Disease  [address] [facility], [place] [address]  Phone: [phone]  Fax: [phone]  Follow Up Time:

## 2021-11-22 NOTE — DISCHARGE NOTE NURSING/CASE MANAGEMENT/SOCIAL WORK - PATIENT PORTAL LINK FT
You can access the FollowMyHealth Patient Portal offered by [facility] by registering at the following website: [email or web address]. By joining [employer]’s FollowMyHealth portal, you will also be able to view your health information using other applications (apps) compatible with our system.

## 2021-11-22 NOTE — ASU PATIENT PROFILE, ADULT - NS SC CAGE ALCOHOL EYE OPENER
You can access the FollowMyHealth Patient Portal offered by [facility] by registering at the following website: [email or web address]. By joining [employer]’s FollowMyHealth portal, you will also be able to view your health information using other applications (apps) compatible with our system. MODERATE no

## 2021-11-22 NOTE — DISCHARGE NOTE PROVIDER - NSDCFUSCHEDAPPT_GEN_ALL_CORE_FT
UYEN LAZAR ; 01/21/2022 ; [facility] Card[facility] [address]  UYEN LAZAR ; 01/27/2022 ; [facility] Endocrin [address]  UYEN LAZAR ; 02/10/2022 ; [facility] Endocr[facility] [address]

## 2021-11-22 NOTE — DISCHARGE NOTE PROVIDER - NSDCMRMEDTOKEN_GEN_ALL_CORE_FT
allopurinol 100 mg oral tablet: 1 tab(s) orally once a day  carvedilol 6.25 mg oral tablet: 1 tab(s) orally 2 times a day  Entresto 97 mg-103 mg oral tablet: 1 tab(s) orally 2 times a day  escitalopram 10 mg oral tablet: 1 tab(s) orally once a day  finasteride 5 mg oral tablet: 0.5 tab(s) orally 3 times a week (M/W/F)  Plavix 75 mg oral tablet: 1 tab(s) orally once a day  rosuvastatin 20 mg oral tablet: 1 tab(s) orally once a day (at bedtime)  Tapazole 5 mg oral tablet: 1 tab(s) orally 4 times a week

## 2021-11-29 DIAGNOSIS — I35.0 NONRHEUMATIC AORTIC (VALVE) STENOSIS: ICD-10-CM

## 2022-01-01 NOTE — PATIENT PROFILE ADULT. - NS TRANSFER PATIENT BELONGINGS
Cell Phone/PDA (specify)/Clothing
I examined baby at the bedside and reviewed with mother: medical history as above, medications as above, normal sonograms.  Full term, well appearing  female, continue routine  care and anticipatory guidance  Maternal history of SLE with +SSA, fetal echo with normal AZ interval, will send  EKG and review with cardiology    Gen: awake, alert, active  HEENT: anterior fontanel open soft and flat. no cleft lip/palate, ears normal set, no ear pits or tags, no lesions in mouth/throat,  red reflex positive bilaterally, nares clinically patent  Resp: good air entry and clear to auscultation bilaterally  Cardiac: Normal S1/S2, regular rate and rhythm, no murmurs, rubs or gallops, 2+ femoral pulses bilaterally  Abd: soft, non tender, non distended, normal bowel sounds, no organomegaly,  umbilicus clean/dry/intact  Neuro: +grasp/suck/bassam, normal tone  Extremities: negative lee and ortolani, full range of motion x 4, no clavicular crepitus  Skin: pink, Peruvian spot over buttocks, nevus simplex over nose and above upper lip  Genital Exam: normal female anatomy, zuleyka 1, anus visually patent    Stephen García MD  Pediatric Hospitalist

## 2022-01-27 ENCOUNTER — APPOINTMENT (OUTPATIENT)
Dept: ENDOCRINOLOGY | Facility: CLINIC | Age: 72
End: 2022-01-27
Payer: MEDICARE

## 2022-01-27 PROCEDURE — 76536 US EXAM OF HEAD AND NECK: CPT

## 2022-02-07 ENCOUNTER — LABORATORY RESULT (OUTPATIENT)
Age: 72
End: 2022-02-07

## 2022-02-07 NOTE — ASU PATIENT PROFILE, ADULT - ANESTHESIA, PREVIOUS REACTION, PROFILE
FYI to provider - Patient is lost to pap tracking follow-up. Attempts to contact pt have been made per reminder process and there has been no reply and/or no appt scheduled.       6/5/18 NIL pap  11/24/20 HSIL, +HR HPV (not 16/18). Plan: colposcopy due before 2/24/21 12/14/20 Colpo bx LANA I, II, and III. ECC neg. Plan: colposcopy with cotest in 1 year  11/30/21 Reminder letter  12/30/21 Reminder call -- left message   2/7/22 Lost to follow-up for pap tracking    Palak Ayon RN BSN, Pap Tracking    none

## 2022-02-10 ENCOUNTER — APPOINTMENT (OUTPATIENT)
Dept: ENDOCRINOLOGY | Facility: CLINIC | Age: 72
End: 2022-02-10
Payer: MEDICARE

## 2022-02-10 VITALS
HEIGHT: 72 IN | DIASTOLIC BLOOD PRESSURE: 72 MMHG | BODY MASS INDEX: 31.02 KG/M2 | SYSTOLIC BLOOD PRESSURE: 116 MMHG | WEIGHT: 229 LBS | HEART RATE: 51 BPM | OXYGEN SATURATION: 97 %

## 2022-02-10 PROCEDURE — 99214 OFFICE O/P EST MOD 30 MIN: CPT

## 2022-02-10 NOTE — ASSESSMENT
[FreeTextEntry1] : Pre-DM?[id number] based on 2 FBS >=126 but  A1c is better \par \par -Increase exercise as tolerated 5 days a week x 30 mins/day\par -Increase dietary efforts, low carb/low sugar gave snack ideas \par -Repeat HgbA1C prior to next appt \par \par Hyperthyroidism\par -Cont  MMI 5 mg QOD\par \par Thyroid Nodules\par -Repeat Thyroid u/s doen in 1/29 all stabl;e  repeat 1 year \par \par Vit B12 Deficiency\par -continue multi-vitamin\par \par Vit D Deficiency\par -Continue Vit D supplement\par \par \par RTO in 4 months Dr. Orozco

## 2022-02-10 NOTE — HISTORY OF PRESENT ILLNESS
[FreeTextEntry1] : Quality: Pre-DM \par Modifying Factors: Diet Control\par \par SMBG\par does not check \par \par HgbA1C: 6.0%\par \par Current Regimen: not on medication \par \par Kidney Disease: none \par Heart Disease: open heart surgery 2013, goes to cardiologist regularly, 5 stents and quadruple bypass\par \par Weight: stable \par Diet: tried to watch, but eats cookies at night \par Exercise: 3x's a week goes to the gym \par Smoking: none\par \par Hyperthyroidism\par \par MMI 5 mg QOD\par \par Thyroid Nodule\par -FNA in past, due in 12/2021\par \par Vit B12 Deficiency\par \par -MVI\par \par Vit  D Deficiency \par \par -Liquid Vit D

## 2022-02-10 NOTE — PHYSICAL EXAM
[Alert] : alert [Normal Sclera/Conjunctiva] : normal sclera/conjunctiva [Normal Hearing] : hearing was normal [No Neck Mass] : no neck mass was observed [No Respiratory Distress] : no respiratory distress [Clear to Auscultation] : lungs were clear to auscultation bilaterally [Normal S1, S2] : normal S1 and S2 [Normal Rate] : heart rate was normal [No Stigmata of Cushings Syndrome] : no stigmata of Cushings Syndrome [Normal Gait] : normal gait [Oriented x3] : oriented to person, place, and time [de-identified] : mild thyromegaly

## 2022-02-25 ENCOUNTER — NON-APPOINTMENT (OUTPATIENT)
Age: 72
End: 2022-02-25

## 2022-02-25 ENCOUNTER — APPOINTMENT (OUTPATIENT)
Dept: CARDIOLOGY | Facility: CLINIC | Age: 72
End: 2022-02-25
Payer: MEDICARE

## 2022-02-25 VITALS
BODY MASS INDEX: 30.88 KG/M2 | OXYGEN SATURATION: 97 % | WEIGHT: 228 LBS | HEART RATE: 67 BPM | SYSTOLIC BLOOD PRESSURE: 112 MMHG | TEMPERATURE: 98.3 F | HEIGHT: 72 IN | DIASTOLIC BLOOD PRESSURE: 64 MMHG

## 2022-02-25 DIAGNOSIS — I10 ESSENTIAL (PRIMARY) HYPERTENSION: ICD-10-CM

## 2022-02-25 DIAGNOSIS — I25.5 ISCHEMIC CARDIOMYOPATHY: ICD-10-CM

## 2022-02-25 PROCEDURE — 93000 ELECTROCARDIOGRAM COMPLETE: CPT

## 2022-02-25 PROCEDURE — 99214 OFFICE O/P EST MOD 30 MIN: CPT

## 2022-02-25 RX ORDER — ROSUVASTATIN CALCIUM 20 MG/1
20 TABLET, FILM COATED ORAL DAILY
Qty: 90 | Refills: 3 | Status: ACTIVE | COMMUNITY
Start: 1900-01-01 | End: 1900-01-01

## 2022-02-25 RX ORDER — METHIMAZOLE 5 MG/1
5 TABLET ORAL EVERY OTHER DAY
Qty: 45 | Refills: 1 | Status: ACTIVE | COMMUNITY
Start: 2018-12-14

## 2022-02-25 RX ORDER — CLOPIDOGREL BISULFATE 75 MG/1
75 TABLET, FILM COATED ORAL
Qty: 90 | Refills: 3 | Status: ACTIVE | COMMUNITY
Start: 2017-06-09 | End: 1900-01-01

## 2022-02-25 RX ORDER — SACUBITRIL AND VALSARTAN 97; 103 MG/1; MG/1
97-103 TABLET, FILM COATED ORAL
Qty: 180 | Refills: 3 | Status: ACTIVE | COMMUNITY
Start: 2021-03-05 | End: 1900-01-01

## 2022-02-25 RX ORDER — CARVEDILOL 6.25 MG/1
6.25 TABLET, FILM COATED ORAL
Qty: 180 | Refills: 3 | Status: ACTIVE | COMMUNITY
Start: 2021-03-05 | End: 1900-01-01

## 2022-03-01 ENCOUNTER — APPOINTMENT (OUTPATIENT)
Dept: CARDIOLOGY | Facility: CLINIC | Age: 72
End: 2022-03-01

## 2022-03-02 ENCOUNTER — APPOINTMENT (OUTPATIENT)
Dept: CARDIOLOGY | Facility: CLINIC | Age: 72
End: 2022-03-02
Payer: MEDICARE

## 2022-03-02 PROCEDURE — 93880 EXTRACRANIAL BILAT STUDY: CPT

## 2022-03-23 NOTE — HISTORY OF PRESENT ILLNESS
[FreeTextEntry1] : 71 year old male with PMH of prediabetes, coronary disease S/P CABG in [place] in 2012, s/p multiple PCIs- last PCI 12/2019, HLD, HTN, and hyperthyroidism. Patient presents for routine follow up. He states he is feeling well. Denies CP, SOB, JURADO, edema, palpitations, near syncope or syncope. He exercises routinely, 1-1.5 hours 3x/week, denies chest pain or SOB. He also changed his diet and no longer eats red meat. TTE from 11/2019 LVEF 46%, mild-mod MR, and moderate AS, and apical inferior hypokinesis. Subsequent LHC in 12/2019 with LIMA to LAD dz s/p Omar MARCELINA x2. He then had a stress test in Feb 2021 which was abnormal and had a subsequent cardiac cath with no changes.  He was started on Entresto and he feels much improved.  He is back to the gym and doing well.  His last LVEF up to 40% and moderate aortic stenosis also noted.  He denies exertional dyspnea, chest pain and near syncope.

## 2022-03-23 NOTE — CARDIOLOGY SUMMARY
[de-identified] : 2/25/2022:  Sinus  Rhythm \par Repolarization abnormalities suggestive of lateral ischemia.  [de-identified] : 11/9/2021:  LVEF 40% with moderate aortic stenosis

## 2022-03-23 NOTE — PHYSICAL EXAM
[Well Developed] : well developed [Well Nourished] : well nourished [No Acute Distress] : no acute distress [Normal Conjunctiva] : normal conjunctiva [Normal Venous Pressure] : normal venous pressure [No Carotid Bruit] : no carotid bruit [Normal S1, S2] : normal S1, S2 [No Gallop] : no gallop [Murmur] : murmur [Clear Lung Fields] : clear lung fields [Good Air Entry] : good air entry [No Respiratory Distress] : no respiratory distress  [Soft] : abdomen soft [Non Tender] : non-tender [No Masses/organomegaly] : no masses/organomegaly [Normal Bowel Sounds] : normal bowel sounds [Normal Gait] : normal gait [No Edema] : no edema [No Cyanosis] : no cyanosis [No Varicosities] : no varicosities [No Rash] : no rash [No Skin Lesions] : no skin lesions [Moves all extremities] : moves all extremities [No Focal Deficits] : no focal deficits [Normal Speech] : normal speech [Alert and Oriented] : alert and oriented [Normal memory] : normal memory [de-identified] : 3/6 systolic murmur

## 2022-04-06 NOTE — IMPRESSION
[FreeTextEntry1] : Overall stable thyroid with decreased size of the right upper pole cyst [FreeTextEntry2] : Repeat ultrasound in 1 year

## 2022-04-06 NOTE — ED PROVIDER NOTE - LOCATION
"Subjective:     CC:   Chief Complaint   Patient presents with   • Lab Results       HPI:   Thania presents today for follow-up patient states she is feeling great she has stopped drinking alcohol and has lost weight which makes her feel 100% better.    Past Medical History:   Diagnosis Date   • Cataract     bilat IOL    • Dental disorder     full dentures    • Edema, lower extremity        Social History     Tobacco Use   • Smoking status: Never Smoker   • Smokeless tobacco: Never Used   Vaping Use   • Vaping Use: Never used   Substance Use Topics   • Alcohol use: Not Currently     Comment: 2 drinks per day   • Drug use: No       Current Outpatient Medications Ordered in Epic   Medication Sig Dispense Refill   • fluocinonide (LIDEX) 0.05 % Cream To rash on left lower leg twice a day for up to 2 weeks as needed for itching 60 g 0   • hydrochlorothiazide (MICROZIDE) 12.5 MG capsule 1 tablet p.o. daily as needed 90 Capsule 1   • ibuprofen (MOTRIN) 200 MG Tab Take 100 mg by mouth as needed.     • therapeutic multivitamin-minerals (THERAGRAN-M) Tab Take 1 Tab by mouth every day.       No current [facility]-ordered facility-administered medications on file.       Allergies:  Patient has no known allergies.    Health Maintenance: Completed    ROS:  Gen: no fevers/chills, patient has lost 26 pounds in 3 months  Eyes: no changes in vision  ENT: no sore throat, no hearing loss, no bloody nose  Pulm: no sob, no cough  CV: no chest pain, no palpitations  GI: no nausea/vomiting, no diarrhea  : no dysuria  MSk: no myalgias  Skin: no rash, patient states the eczema on her leg has completely resolved with the cream and would like some more just in case  Neuro: no headaches, no numbness/tingling  Heme/Lymph: no easy bruising    Objective:     Exam:  /70 (BP Location: Left arm, Patient Position: Sitting, BP Cuff Size: Adult)   Pulse 73   Temp 36.9 °C (98.5 °F) (Temporal)   Resp 16   Ht 1.651 m (5' 5\")   Wt 82.6 kg (182 lb)   " SpO2 97%   BMI 30.29 kg/m²  Body mass index is 30.29 kg/m².    Gen: Alert and oriented, No apparent distress.  Skin: Warm and dry.  No obvious lesions.  Eyes: Sclera wnl Pupils normal in size  Lungs: Normal effort, CTA bilaterally, no wheezes, rhonchi, or rales  CV: Regular rate and rhythm. No murmurs, rubs, or gallops.  ABD: Soft non-tender no organomegaly  Musculoskeletal: Normal gait. No extremity cyanosis, clubbing, or edema.  Neuro: Oriented to person, place and time  Psych: Mood is wnl       Assessment & Plan:     76 y.o. female with the following -     1. Weight gain  With patient decreasing her alcohol intake she is lost 28 pounds patient states she feels great this is a resolved problem  2. Hypertension, unspecified type  Due to her weight loss patient's blood pressure looks great patient is taking the water pill but is looking more for swelling patient denies any dizziness or lightheadedness.  Patient only to take the water pill as needed no more than once a day this is a chronic problem  3. Wellness examination  Patient agreeable to get the Cologuard done  - COLOGUARD (FIT DNA)    4. Elevated MCV  Patient's MCV is back to normal her platelet count looks great more than likely this was all due to alcohol.  Patient states that her outlook has greatly improved and she feels a lot better.  This is a resolved problem    5. Dermatitis  Patient's dermatitis has gone away but would like to Lidex cream just in case it recurs this is a chronic problem    Other orders  - fluocinonide (LIDEX) 0.05 % Cream; To rash on left lower leg twice a day for up to 2 weeks as needed for itching  Dispense: 60 g; Refill: 0  - hydrochlorothiazide (MICROZIDE) 12.5 MG capsule; 1 tablet p.o. daily as needed  Dispense: 90 Capsule; Refill: 1       Return in about 3 months (around 7/6/2022).    Please note that this dictation was created using voice recognition software. I have made every reasonable attempt to correct obvious errors,  but I expect that there are errors of grammar and possibly content that I did not discover before finalizing the note.   pectoral region

## 2022-04-06 NOTE — PROCEDURE
[SwapBeats e 2008 model, 10-12 MHz frequencies] : multiple real time longitudinal and transverse images were obtained using a high resolution ultrasound with a linear transducer, SwapBeats e 2008 model, 10-12 MHz frequencies. All measurements will be reported as longitudinal x carrie-posterior x transverse. [Report dated ___] : Report dated [unfilled] [] : a homogeneous parenchyma [FreeTextEntry1] : 4.5 x 2.3 x 1.8 [FreeTextEntry5] : 4.6 x 1.7 x 1.7 [FreeTextEntry2] : 0.6 [de-identified] : Right lobe\par Upper pole cyst measuring smaller at 0.2 cm\par

## 2022-04-29 ENCOUNTER — APPOINTMENT (OUTPATIENT)
Dept: CARDIOLOGY | Facility: CLINIC | Age: 72
End: 2022-04-29

## 2022-05-19 ENCOUNTER — APPOINTMENT (OUTPATIENT)
Dept: CARDIOLOGY | Facility: CLINIC | Age: 72
End: 2022-05-19
Payer: MEDICARE

## 2022-05-19 PROCEDURE — 93306 TTE W/DOPPLER COMPLETE: CPT

## 2022-05-20 ENCOUNTER — NON-APPOINTMENT (OUTPATIENT)
Age: 72
End: 2022-05-20

## 2022-05-20 ENCOUNTER — APPOINTMENT (OUTPATIENT)
Dept: CARDIOLOGY | Facility: CLINIC | Age: 72
End: 2022-05-20
Payer: MEDICARE

## 2022-05-20 VITALS
TEMPERATURE: 98.5 F | SYSTOLIC BLOOD PRESSURE: 122 MMHG | BODY MASS INDEX: 30.75 KG/M2 | RESPIRATION RATE: 14 BRPM | HEART RATE: 64 BPM | WEIGHT: 227 LBS | HEIGHT: 72 IN | OXYGEN SATURATION: 95 % | DIASTOLIC BLOOD PRESSURE: 80 MMHG

## 2022-05-20 DIAGNOSIS — I65.23 OCCLUSION AND STENOSIS OF BILATERAL CAROTID ARTERIES: ICD-10-CM

## 2022-05-20 DIAGNOSIS — I25.10 ATHEROSCLEROTIC HEART DISEASE OF NATIVE CORONARY ARTERY W/OUT ANGINA PECTORIS: ICD-10-CM

## 2022-05-20 DIAGNOSIS — E78.2 MIXED HYPERLIPIDEMIA: ICD-10-CM

## 2022-05-20 DIAGNOSIS — I35.0 NONRHEUMATIC AORTIC (VALVE) STENOSIS: ICD-10-CM

## 2022-05-20 DIAGNOSIS — I10 ESSENTIAL (PRIMARY) HYPERTENSION: ICD-10-CM

## 2022-05-20 PROCEDURE — 93000 ELECTROCARDIOGRAM COMPLETE: CPT

## 2022-05-20 PROCEDURE — 99214 OFFICE O/P EST MOD 30 MIN: CPT

## 2022-05-20 RX ORDER — ESCITALOPRAM OXALATE 20 MG/1
20 TABLET ORAL DAILY
Qty: 90 | Refills: 3 | Status: ACTIVE | COMMUNITY
Start: 2021-05-07 | End: 1900-01-01

## 2022-06-03 ENCOUNTER — LABORATORY RESULT (OUTPATIENT)
Age: 72
End: 2022-06-03

## 2022-06-14 NOTE — PHYSICAL EXAM
[Well Developed] : well developed [Well Nourished] : well nourished [No Acute Distress] : no acute distress [Normal Conjunctiva] : normal conjunctiva [Normal Venous Pressure] : normal venous pressure [No Carotid Bruit] : no carotid bruit [Normal S1, S2] : normal S1, S2 [No Gallop] : no gallop [Murmur] : murmur [Clear Lung Fields] : clear lung fields [Good Air Entry] : good air entry [No Respiratory Distress] : no respiratory distress  [Soft] : abdomen soft [Non Tender] : non-tender [No Masses/organomegaly] : no masses/organomegaly [Normal Bowel Sounds] : normal bowel sounds [Normal Gait] : normal gait [No Edema] : no edema [No Cyanosis] : no cyanosis [No Varicosities] : no varicosities [No Rash] : no rash [No Skin Lesions] : no skin lesions [Moves all extremities] : moves all extremities [No Focal Deficits] : no focal deficits [Normal Speech] : normal speech [Alert and Oriented] : alert and oriented [Normal memory] : normal memory [de-identified] : 3/6 systolic murmur

## 2022-06-14 NOTE — HISTORY OF PRESENT ILLNESS
[FreeTextEntry1] : 71 year old male with PMH of prediabetes, coronary disease S/P CABG in [place] in 2012, s/p multiple PCIs- last PCI 12/2019, HLD, HTN, and hyperthyroidism. Patient presents for routine follow up. He states he is feeling well. Denies CP, SOB, JURADO, edema, palpitations, near syncope or syncope. He exercises routinely, 1-1.5 hours 3x/week, denies chest pain or SOB. He also changed his diet and no longer eats red meat. TTE from 11/2019 LVEF 46%, mild-mod MR, and moderate AS, and apical inferior hypokinesis. Subsequent LHC in 12/2019 with LIMA to LAD dz s/p Omar MARCELINA x2. He then had a stress test in Feb 2021 which was abnormal and had a subsequent cardiac cath with no changes.  He was started on Entresto and he feels much improved.  He is back to the gym and doing well. His echo today with LVEF 49% with moderate aortic stenosis.  He has been packing and moving boxes to move to [place] with no chest pain and only some dyspnea with extreme exertion.  He denies lightheadedness.

## 2022-08-05 ENCOUNTER — APPOINTMENT (OUTPATIENT)
Dept: CARDIOLOGY | Facility: CLINIC | Age: 72
End: 2022-08-05

## 2022-08-29 ENCOUNTER — APPOINTMENT (OUTPATIENT)
Dept: ENDOCRINOLOGY | Facility: CLINIC | Age: 72
End: 2022-08-29

## 2022-11-23 NOTE — ED ADULT NURSE REASSESSMENT NOTE - MUSCULOSKELETAL ASSESSMENT
No changes for patient tpo meds.  Reports PT got a high reading on BP but patient is asymptomatic.  On my reading BP is WNL.  Continue to reassess BP at future visits.
- - -

## 2022-12-10 NOTE — ED ADULT NURSE NOTE - CADM POA URETHRAL CATHETER
No
CONSTITUTIONAL: Well-appearing; well-nourished; in no apparent distress.   NECK: Supple; non-tender; no cervical lymphadenopathy.   CARDIOVASCULAR: Normal S1, S2; no murmurs, rubs, or gallops.   RESPIRATORY: Normal chest excursion with respiration; breath sounds clear and equal bilaterally; no wheezes, rhonchi, or rales.  GI/: Normal bowel sounds; mild diffuse ttp; non-distended  MS: No evidence of trauma or deformity. No CVA tenderness. Normal ROM in all four extremities; non-tender to palpation; distal pulses are normal.   SKIN: Normal for age and race; warm; dry; good turgor; no apparent lesions or exudate.   NEURO/PSYCH: A & O x 4; grossly unremarkable. mood and manner are appropriate.

## 2023-02-24 ENCOUNTER — TELEPHONE ENCOUNTER (OUTPATIENT)
Dept: URBAN - METROPOLITAN AREA CLINIC 68 | Facility: CLINIC | Age: 73
End: 2023-02-24

## 2023-02-24 ENCOUNTER — OFFICE VISIT (OUTPATIENT)
Dept: URBAN - METROPOLITAN AREA CLINIC 68 | Facility: CLINIC | Age: 73
End: 2023-02-24

## 2023-02-24 RX ORDER — TIZANIDINE 4 MG/1
TABLET ORAL
Qty: 30 EACH | Refills: 0 | Status: ACTIVE | COMMUNITY

## 2023-02-24 RX ORDER — SACUBITRIL AND VALSARTAN 97; 103 MG/1; MG/1
TABLET, FILM COATED ORAL
Qty: 180 TABLET | Status: ACTIVE | COMMUNITY

## 2023-02-24 RX ORDER — ALPRAZOLAM 1 MG/1
TAKE 1 TABLET BY MOUTH BEFORE PROCEDURE AND REPEAT IF NEEDED TABLET ORAL
Qty: 2 EACH | Refills: 0 | Status: ACTIVE | COMMUNITY

## 2023-02-24 RX ORDER — CLOPIDOGREL BISULFATE 75 MG/1
TABLET, FILM COATED ORAL
Qty: 90 TABLET | Status: ACTIVE | COMMUNITY

## 2023-02-24 RX ORDER — ESCITALOPRAM 20 MG/1
TAKE 1 TABLET BY MOUTH DAILY TABLET, FILM COATED ORAL
Qty: 90 EACH | Refills: 0 | Status: ACTIVE | COMMUNITY

## 2023-02-24 RX ORDER — FINASTERIDE 5 MG/1
TABLET, FILM COATED ORAL
Qty: 20 TABLET | Status: ACTIVE | COMMUNITY

## 2023-02-24 RX ORDER — SOD SULF/POT CHLORIDE/MAG SULF 1.479 G
AS DIRECTED TABLET ORAL ONCE
Qty: 1 PACKET | Refills: 0 | OUTPATIENT
Start: 2023-02-24

## 2023-02-24 RX ORDER — CARVEDILOL 6.25 MG/1
TABLET, FILM COATED ORAL
Qty: 180 TABLET | Status: ACTIVE | COMMUNITY

## 2023-02-24 RX ORDER — ALBUTEROL SULFATE 90 UG/1
INHALE 2 PUFFS BY MOUTH EVERY 6 HOURS AS NEEDED AEROSOL, METERED RESPIRATORY (INHALATION)
Qty: 6.7 GRAM | Refills: 0 | Status: ACTIVE | COMMUNITY

## 2023-02-24 RX ORDER — ALLOPURINOL 100 MG/1
TABLET ORAL
Qty: 90 TABLET | Status: ACTIVE | COMMUNITY

## 2023-02-24 RX ORDER — ROSUVASTATIN CALCIUM 20 MG/1
TABLET, FILM COATED ORAL
Qty: 90 EACH | Refills: 0 | Status: ACTIVE | COMMUNITY

## 2023-02-24 NOTE — EXAM-MIGRATED EXAMINATIONS
General Examination: Lungs: -> clear to auscultation bilaterally, with good air movement and no rales, rhonchi or wheezes   Breasts: ->    Abdomen: -> soft with good bowel sounds, nontender, and no masses or hepatosplenomegaly   Rectal: -> not examined    Extremities: -> normal extremity with no clubbing, cyanosis or edema   Neurologic: -> alert and oriented, normal exam with no motor or sensory deficits   Ears: -> normal   Oral cavity: -> mucosa moist   Neck / thyroid: ->    Lymph nodes: ->    Skin: -> skin is warm and dry, with no rashes, good skin turgor and normal hair distribution, with no suspicious skin lesions   Heart: -> regular rate and rhythm without murmurs, gallops, clicks or rubs   General appearance: -> alert, pleasant, well-nourished and in no acute distress   Head: -> normocephalic, atraumatic   Eyes: -> pupils equal, round, reactive to light and accommodation, sclera anicteric

## 2023-02-24 NOTE — HPI-MIGRATED HPI
Transition of Care : Patient evaluated due colon polyps.  Denies nausea, vomits, dysphagia, odynophagia, heartburn, abdominal pain, diarrhea, constipation GI bleeding or weight loss

## 2023-03-01 ENCOUNTER — TELEPHONE ENCOUNTER (OUTPATIENT)
Dept: URBAN - METROPOLITAN AREA CLINIC 68 | Facility: CLINIC | Age: 73
End: 2023-03-01

## 2023-03-21 ENCOUNTER — TELEPHONE ENCOUNTER (OUTPATIENT)
Dept: URBAN - METROPOLITAN AREA CLINIC 68 | Facility: CLINIC | Age: 73
End: 2023-03-21

## 2023-04-04 NOTE — H&P PST ADULT - NS NEC GEN PE MLT EXAM PC
In general you are doing well and I am updating all your major blood chemistry.  After reviewing those results we can decide on when to get together again.  I have renewed your atorvastatin and pantoprazole  
No bruits; no thyromegaly or nodules

## 2023-04-28 ENCOUNTER — TELEPHONE ENCOUNTER (OUTPATIENT)
Dept: URBAN - METROPOLITAN AREA CLINIC 68 | Facility: CLINIC | Age: 73
End: 2023-04-28

## 2023-05-03 ENCOUNTER — OFFICE VISIT (OUTPATIENT)
Dept: URBAN - METROPOLITAN AREA SURGERY CENTER 12 | Facility: SURGERY CENTER | Age: 73
End: 2023-05-03

## 2023-05-03 ENCOUNTER — DASHBOARD ENCOUNTERS (OUTPATIENT)
Age: 73
End: 2023-05-03

## 2023-05-03 RX ORDER — SOD SULF/POT CHLORIDE/MAG SULF 1.479 G
AS DIRECTED TABLET ORAL ONCE
Qty: 1 PACKET | Refills: 0 | Status: ACTIVE | COMMUNITY
Start: 2023-02-24

## 2023-05-03 RX ORDER — FINASTERIDE 5 MG/1
TABLET, FILM COATED ORAL
Qty: 20 TABLET | Status: ACTIVE | COMMUNITY

## 2023-05-03 RX ORDER — SACUBITRIL AND VALSARTAN 97; 103 MG/1; MG/1
TABLET, FILM COATED ORAL
Qty: 180 TABLET | Status: ACTIVE | COMMUNITY

## 2023-05-03 RX ORDER — ALBUTEROL SULFATE 90 UG/1
INHALE 2 PUFFS BY MOUTH EVERY 6 HOURS AS NEEDED AEROSOL, METERED RESPIRATORY (INHALATION)
Qty: 6.7 GRAM | Refills: 0 | Status: ACTIVE | COMMUNITY

## 2023-05-03 RX ORDER — ESCITALOPRAM 20 MG/1
TAKE 1 TABLET BY MOUTH DAILY TABLET, FILM COATED ORAL
Qty: 90 EACH | Refills: 0 | Status: ACTIVE | COMMUNITY

## 2023-05-03 RX ORDER — ROSUVASTATIN CALCIUM 20 MG/1
TABLET, FILM COATED ORAL
Qty: 90 EACH | Refills: 0 | Status: ACTIVE | COMMUNITY

## 2023-05-03 RX ORDER — CARVEDILOL 6.25 MG/1
TABLET, FILM COATED ORAL
Qty: 180 TABLET | Status: ACTIVE | COMMUNITY

## 2023-05-03 RX ORDER — TIZANIDINE 4 MG/1
TABLET ORAL
Qty: 30 EACH | Refills: 0 | Status: ACTIVE | COMMUNITY

## 2023-05-03 RX ORDER — ALPRAZOLAM 1 MG/1
TAKE 1 TABLET BY MOUTH BEFORE PROCEDURE AND REPEAT IF NEEDED TABLET ORAL
Qty: 2 EACH | Refills: 0 | Status: ACTIVE | COMMUNITY

## 2023-05-03 RX ORDER — ALLOPURINOL 100 MG/1
TABLET ORAL
Qty: 90 TABLET | Status: ACTIVE | COMMUNITY

## 2023-05-03 RX ORDER — CLOPIDOGREL BISULFATE 75 MG/1
TABLET, FILM COATED ORAL
Qty: 90 TABLET | Status: ACTIVE | COMMUNITY

## 2023-05-10 ENCOUNTER — TELEPHONE ENCOUNTER (OUTPATIENT)
Dept: URBAN - METROPOLITAN AREA CLINIC 68 | Facility: CLINIC | Age: 73
End: 2023-05-10

## 2023-06-27 ENCOUNTER — APPOINTMENT (RX ONLY)
Dept: URBAN - METROPOLITAN AREA CLINIC 124 | Facility: CLINIC | Age: 73
Setting detail: DERMATOLOGY
End: 2023-06-27

## 2023-06-27 DIAGNOSIS — L57.0 ACTINIC KERATOSIS: ICD-10-CM | Status: INADEQUATELY CONTROLLED

## 2023-06-27 DIAGNOSIS — L82.1 OTHER SEBORRHEIC KERATOSIS: ICD-10-CM

## 2023-06-27 DIAGNOSIS — L81.8 OTHER SPECIFIED DISORDERS OF PIGMENTATION: ICD-10-CM

## 2023-06-27 PROCEDURE — ? ORDER FOR PHOTODYNAMIC THERAPY

## 2023-06-27 PROCEDURE — ? COUNSELING

## 2023-06-27 PROCEDURE — ? PHOTODYNAMIC THERAPY COUNSELING

## 2023-06-27 PROCEDURE — 99203 OFFICE O/P NEW LOW 30 MIN: CPT

## 2023-06-27 ASSESSMENT — LOCATION SIMPLE DESCRIPTION DERM
LOCATION SIMPLE: RIGHT TEMPLE
LOCATION SIMPLE: LEFT TEMPLE
LOCATION SIMPLE: LEFT UPPER ARM
LOCATION SIMPLE: SCALP
LOCATION SIMPLE: LEFT ANTERIOR NECK
LOCATION SIMPLE: RIGHT FOREHEAD

## 2023-06-27 ASSESSMENT — LOCATION ZONE DERM
LOCATION ZONE: SCALP
LOCATION ZONE: ARM
LOCATION ZONE: FACE
LOCATION ZONE: NECK

## 2023-06-27 ASSESSMENT — LOCATION DETAILED DESCRIPTION DERM
LOCATION DETAILED: LEFT PROXIMAL POSTERIOR UPPER ARM
LOCATION DETAILED: RIGHT MID TEMPLE
LOCATION DETAILED: RIGHT MEDIAL FOREHEAD
LOCATION DETAILED: LEFT MID TEMPLE
LOCATION DETAILED: LEFT SUPERIOR LATERAL NECK
LOCATION DETAILED: RIGHT SUPERIOR PARIETAL SCALP

## 2023-06-27 NOTE — PROCEDURE: ORDER FOR PHOTODYNAMIC THERAPY
Consent: The procedure and risks were reviewed with the patient including but not limited to: burning, pigmentary changes, pain, blistering, scabbing, redness, and the possibility of needing numerous treatments. Strict photoprotection after the procedure was also discussed.
Arms Incubation Time: 2 Hours
Occlusion: No
Frequency Of Pdt: Three treatments 8 weeks apart
Face And Scalp Incubation Time: 1 Hour for the face and 2 Hours for the scalp
Neck Incubation Time: 1 Hour
Debridement: Yes
Detail Level: Simple
Incubation Override: 2 hour
Photosensitizer: Levulan
Location Override: scalp and forehead
Pdt Type: PINEDA-U
Location: Override

## 2023-08-11 ENCOUNTER — APPOINTMENT (RX ONLY)
Dept: URBAN - METROPOLITAN AREA CLINIC 125 | Facility: CLINIC | Age: 73
Setting detail: DERMATOLOGY
End: 2023-08-11

## 2023-08-11 DIAGNOSIS — L57.0 ACTINIC KERATOSIS: ICD-10-CM

## 2023-08-11 PROCEDURE — 96574 DBRDMT PRMLG LES W/PDT: CPT

## 2023-08-11 PROCEDURE — ? PHOTO-DOCUMENTATION

## 2023-08-11 PROCEDURE — ? PDT: BLUE

## 2023-08-11 ASSESSMENT — LOCATION DETAILED DESCRIPTION DERM
LOCATION DETAILED: LEFT SUPERIOR PARIETAL SCALP
LOCATION DETAILED: POSTERIOR MID-PARIETAL SCALP
LOCATION DETAILED: RIGHT LATERAL FOREHEAD
LOCATION DETAILED: LEFT SUPERIOR MEDIAL FOREHEAD
LOCATION DETAILED: LEFT LATERAL FOREHEAD
LOCATION DETAILED: RIGHT CENTRAL PARIETAL SCALP
LOCATION DETAILED: RIGHT MEDIAL FOREHEAD

## 2023-08-11 ASSESSMENT — LOCATION SIMPLE DESCRIPTION DERM
LOCATION SIMPLE: POSTERIOR SCALP
LOCATION SIMPLE: SCALP
LOCATION SIMPLE: RIGHT FOREHEAD
LOCATION SIMPLE: LEFT FOREHEAD

## 2023-08-11 ASSESSMENT — LOCATION ZONE DERM
LOCATION ZONE: FACE
LOCATION ZONE: SCALP

## 2023-08-11 NOTE — PROCEDURE: PDT: BLUE
Splint
Which Photosensitizer Was Used: Levulan
Incubation End Time: 10:30am
History Of Hsv?: No
Who Performed The Pdt (Staff): Lashawn Mcrae CDT
Number Of Kerasticks/Tubes Billed For: 1
Detail Level: Zone
Illumination Time: 16:40
Debridement Text (Will Only Render In Visit Note If You Select Debridement Option Under Who Performed The Pdt Field): Prior to application of the photodynamic medication the hyperkeratotic lesions were curetted to make them more amenable to therapy.
Eye Protection Applied?: Yes
Ndc# (Optional): 80481-248-39
Who Performed The Pdt?: Performed by MD MANUELITO, KARIN or EARLINE with Pre-Procedure Debridement of Hyperkeratotic Lesions ([id number])
Incubation Time: 2 hour
Light Source: Filemon-U
Pre-Procedure Text: The treatment areas were cleaned and prepped in the usual fashion.
Anesthesia Type: 1% lidocaine with epinephrine
Who Performed The Pdt (Provider): Myra Osorio
Incubation Start Time: 8:30am
Total Number Of Aks Treated (Optional To Report): 0
Consent: Written consent obtained. The risks were reviewed with the patient including but not limited to: pigmentary changes, pain, blistering, scabbing, redness, and the remote possibility of scarring.
Show Inventory Tab: Show
Medical Necessity: Precancerous Lesions
Comments: Treatment 1 of 3
Post-Care Instructions: I reviewed with the patient in detail post-care instructions. Patient is to avoid sunlight for the next 2 days, and wear sun protection. Patients may expect sunburn like redness, discomfort and scabbing.

## 2023-08-18 ENCOUNTER — APPOINTMENT (RX ONLY)
Dept: URBAN - METROPOLITAN AREA CLINIC 125 | Facility: CLINIC | Age: 73
Setting detail: DERMATOLOGY
End: 2023-08-18

## 2023-08-18 DIAGNOSIS — L57.0 ACTINIC KERATOSIS: ICD-10-CM

## 2023-08-18 DIAGNOSIS — L28.1 PRURIGO NODULARIS: ICD-10-CM | Status: INADEQUATELY CONTROLLED

## 2023-08-18 PROCEDURE — 99213 OFFICE O/P EST LOW 20 MIN: CPT

## 2023-08-18 PROCEDURE — ? COUNSELING

## 2023-08-18 PROCEDURE — ? DEFER

## 2023-08-18 PROCEDURE — ? ORDER FOR PHOTODYNAMIC THERAPY

## 2023-08-18 PROCEDURE — ? PHOTODYNAMIC THERAPY COUNSELING

## 2023-08-18 ASSESSMENT — LOCATION DETAILED DESCRIPTION DERM
LOCATION DETAILED: RIGHT ANTERIOR DISTAL THIGH
LOCATION DETAILED: RIGHT SUPERIOR PARIETAL SCALP
LOCATION DETAILED: RIGHT LATERAL FOREHEAD
LOCATION DETAILED: LEFT LATERAL FOREHEAD

## 2023-08-18 ASSESSMENT — LOCATION SIMPLE DESCRIPTION DERM
LOCATION SIMPLE: LEFT FOREHEAD
LOCATION SIMPLE: SCALP
LOCATION SIMPLE: RIGHT THIGH
LOCATION SIMPLE: RIGHT FOREHEAD

## 2023-08-18 ASSESSMENT — LOCATION ZONE DERM
LOCATION ZONE: FACE
LOCATION ZONE: SCALP
LOCATION ZONE: LEG

## 2023-08-18 NOTE — PROCEDURE: DEFER
X Size Of Lesion In Cm (Optional): 0
Procedure To Be Performed At Next Visit: Intralesional Kenalog
Introduction Text (Please End With A Colon): Risks reviewed and the patient verbalized understanding.  \\nRequested patient call the clinic when/if they change their mind about
Detail Level: Zone

## 2023-08-18 NOTE — PROCEDURE: ORDER FOR PHOTODYNAMIC THERAPY
Arms And Hands Incubation Time: 2 Hours
Occlusion: Yes
Photosensitizer: Levulan
Face, Ears And  Scalp Incubation Time: 1 Hour
Face And Scalp Incubation Time: 1 Hour for the face and 2 Hours for the scalp
Face Incubation Time: 2 Hour
Consent: The procedure and risks were reviewed with the patient including but not limited to: burning, pigmentary changes, pain, blistering, scabbing, redness, and the possibility of needing numerous treatments. Strict photoprotection after the procedure was also discussed.
Location: Face
Frequency Of Pdt: Three treatments 8 weeks apart
Detail Level: Simple
Pdt Type: PINEDA-U

## 2023-10-06 ENCOUNTER — APPOINTMENT (RX ONLY)
Dept: URBAN - METROPOLITAN AREA CLINIC 125 | Facility: CLINIC | Age: 73
Setting detail: DERMATOLOGY
End: 2023-10-06

## 2023-10-06 DIAGNOSIS — L57.0 ACTINIC KERATOSIS: ICD-10-CM

## 2023-10-06 PROCEDURE — ? PDT: BLUE

## 2023-10-06 PROCEDURE — 96574 DBRDMT PRMLG LES W/PDT: CPT

## 2023-10-06 PROCEDURE — ? PHOTO-DOCUMENTATION

## 2023-10-06 ASSESSMENT — LOCATION DETAILED DESCRIPTION DERM
LOCATION DETAILED: LEFT SUPERIOR OCCIPITAL SCALP
LOCATION DETAILED: LEFT MEDIAL FOREHEAD
LOCATION DETAILED: RIGHT SUPERIOR OCCIPITAL SCALP
LOCATION DETAILED: LEFT LATERAL FOREHEAD
LOCATION DETAILED: RIGHT MID PREAURICULAR CHEEK
LOCATION DETAILED: RIGHT SUPERIOR LATERAL MALAR CHEEK
LOCATION DETAILED: LEFT CENTRAL FRONTAL SCALP
LOCATION DETAILED: LEFT SUPERIOR PARIETAL SCALP
LOCATION DETAILED: LEFT MID PREAURICULAR CHEEK
LOCATION DETAILED: RIGHT CENTRAL FRONTAL SCALP
LOCATION DETAILED: LEFT SUPERIOR LATERAL MALAR CHEEK
LOCATION DETAILED: RIGHT FOREHEAD

## 2023-10-06 ASSESSMENT — LOCATION SIMPLE DESCRIPTION DERM
LOCATION SIMPLE: RIGHT CHEEK
LOCATION SIMPLE: RIGHT OCCIPITAL SCALP
LOCATION SIMPLE: RIGHT FOREHEAD
LOCATION SIMPLE: RIGHT SCALP
LOCATION SIMPLE: LEFT OCCIPITAL SCALP
LOCATION SIMPLE: LEFT FOREHEAD
LOCATION SIMPLE: SCALP
LOCATION SIMPLE: LEFT CHEEK
LOCATION SIMPLE: LEFT SCALP

## 2023-10-06 ASSESSMENT — LOCATION ZONE DERM
LOCATION ZONE: FACE
LOCATION ZONE: SCALP

## 2023-10-06 NOTE — PROCEDURE: PHOTO-DOCUMENTATION
Photo Preface (Leave Blank If You Do Not Want): Photographs were obtained today
Details (Free Text): Treatment #2
Detail Level: Detailed

## 2023-10-06 NOTE — PROCEDURE: PDT: BLUE
Incubation Start Time: 8:45 am
Comments: Treatment 2 of 3
Which Photosensitizer Was Used: Levulan
Treatment Number: 2
Incubation End Time: 10:45 am
Show Inventory Tab: Show
Debridement Text (Will Only Render In Visit Note If You Select Debridement Option Under Who Performed The Pdt Field): Prior to application of the photodynamic medication the hyperkeratotic lesions were curetted to make them more amenable to therapy.
Who Performed The Pdt (Provider): CHACORTA Alcantara
Medical Necessity: Precancerous Lesions
Show Medical Necessity In Plan?: Yes
Occlusion: No
Ndc# (Optional): 54355-233-40
Pre-Procedure Text: The treatment areas were cleaned and prepped in the usual fashion.
Illumination Time: 16:40
Detail Level: Zone
Who Performed The Pdt (Staff): Lashawn Mcrae CDT
Consent: Written consent obtained. The risks were reviewed with the patient including but not limited to: pigmentary changes, pain, blistering, scabbing, redness, and the remote possibility of scarring.
Light Source: Filemon-U
Incubation Time: 2 hours
Total Number Of Aks Treated (Optional To Report): 0
Anesthesia Type: 1% lidocaine with epinephrine
Who Performed The Pdt?: Performed by MD MANUELITO, KARIN or EARLINE with Pre-Procedure Debridement of Hyperkeratotic Lesions ([id number])
Post-Care Instructions: I reviewed with the patient in detail post-care instructions. Patient is to avoid sunlight for the next 2 days, and wear sun protection. Patients may expect sunburn like redness, discomfort and scabbing.

## 2023-11-27 ENCOUNTER — APPOINTMENT (RX ONLY)
Dept: URBAN - METROPOLITAN AREA CLINIC 125 | Facility: CLINIC | Age: 73
Setting detail: DERMATOLOGY
End: 2023-11-27

## 2023-11-27 DIAGNOSIS — L82.1 OTHER SEBORRHEIC KERATOSIS: ICD-10-CM

## 2023-11-27 DIAGNOSIS — L81.4 OTHER MELANIN HYPERPIGMENTATION: ICD-10-CM

## 2023-11-27 DIAGNOSIS — Z09 ENCOUNTER FOR FOLLOW-UP EXAMINATION AFTER COMPLETED TREATMENT FOR CONDITIONS OTHER THAN MALIGNANT NEOPLASM: ICD-10-CM | Status: RESOLVING

## 2023-11-27 PROCEDURE — 99213 OFFICE O/P EST LOW 20 MIN: CPT

## 2023-11-27 PROCEDURE — ? COUNSELING

## 2023-11-27 PROCEDURE — ? DIAGNOSIS COMMENT

## 2023-11-27 ASSESSMENT — LOCATION SIMPLE DESCRIPTION DERM
LOCATION SIMPLE: RIGHT CHEEK
LOCATION SIMPLE: LEFT CHEEK
LOCATION SIMPLE: RIGHT SCALP

## 2023-11-27 ASSESSMENT — LOCATION DETAILED DESCRIPTION DERM
LOCATION DETAILED: LEFT CENTRAL MALAR CHEEK
LOCATION DETAILED: LEFT INFERIOR CENTRAL MALAR CHEEK
LOCATION DETAILED: RIGHT INFERIOR LATERAL MALAR CHEEK
LOCATION DETAILED: RIGHT MEDIAL FRONTAL SCALP

## 2023-11-27 ASSESSMENT — LOCATION ZONE DERM
LOCATION ZONE: FACE
LOCATION ZONE: SCALP

## 2023-11-27 NOTE — PROCEDURE: DIAGNOSIS COMMENT
Comment: Patient will be reevaluated during [facility] in 6 months.
Detail Level: Zone
Render Risk Assessment In Note?: no

## 2024-12-19 ENCOUNTER — NEW PATIENT (OUTPATIENT)
Age: 74
End: 2024-12-19

## 2024-12-19 DIAGNOSIS — H02.043: ICD-10-CM

## 2024-12-19 DIAGNOSIS — I65.29: ICD-10-CM

## 2024-12-19 DIAGNOSIS — H17.9: ICD-10-CM

## 2024-12-19 DIAGNOSIS — H04.201: ICD-10-CM

## 2024-12-19 PROCEDURE — 99204 OFFICE O/P NEW MOD 45 MIN: CPT

## 2025-05-13 ENCOUNTER — APPOINTMENT (OUTPATIENT)
Dept: URBAN - METROPOLITAN AREA CLINIC 124 | Facility: CLINIC | Age: 75
Setting detail: DERMATOLOGY
End: 2025-05-13

## 2025-05-13 DIAGNOSIS — D69.2 OTHER NONTHROMBOCYTOPENIC PURPURA: ICD-10-CM

## 2025-05-13 DIAGNOSIS — L82.0 INFLAMED SEBORRHEIC KERATOSIS: ICD-10-CM

## 2025-05-13 DIAGNOSIS — L82.1 OTHER SEBORRHEIC KERATOSIS: ICD-10-CM

## 2025-05-13 DIAGNOSIS — L57.0 ACTINIC KERATOSIS: ICD-10-CM

## 2025-05-13 PROCEDURE — 99213 OFFICE O/P EST LOW 20 MIN: CPT | Mod: 25

## 2025-05-13 PROCEDURE — 17110 DESTRUCTION B9 LES UP TO 14: CPT

## 2025-05-13 PROCEDURE — ? COUNSELING

## 2025-05-13 PROCEDURE — 17000 DESTRUCT PREMALG LESION: CPT | Mod: 59

## 2025-05-13 PROCEDURE — ? LIQUID NITROGEN

## 2025-05-13 ASSESSMENT — LOCATION SIMPLE DESCRIPTION DERM
LOCATION SIMPLE: LEFT FOREARM
LOCATION SIMPLE: ABDOMEN
LOCATION SIMPLE: LEFT UPPER BACK
LOCATION SIMPLE: RIGHT SCALP
LOCATION SIMPLE: ANTERIOR SCALP
LOCATION SIMPLE: SCALP
LOCATION SIMPLE: RIGHT FOREARM

## 2025-05-13 ASSESSMENT — LOCATION DETAILED DESCRIPTION DERM
LOCATION DETAILED: LEFT PROXIMAL DORSAL FOREARM
LOCATION DETAILED: PERIUMBILICAL SKIN
LOCATION DETAILED: MID-FRONTAL SCALP
LOCATION DETAILED: LEFT SUPERIOR PARIETAL SCALP
LOCATION DETAILED: RIGHT CENTRAL FRONTAL SCALP
LOCATION DETAILED: RIGHT PROXIMAL DORSAL FOREARM
LOCATION DETAILED: LEFT INFERIOR MEDIAL UPPER BACK

## 2025-05-13 ASSESSMENT — LOCATION ZONE DERM
LOCATION ZONE: SCALP
LOCATION ZONE: ARM
LOCATION ZONE: TRUNK

## 2025-05-13 NOTE — PROCEDURE: LIQUID NITROGEN
Spray Paint Technique: No
Medical Necessity Clause: This procedure was medically necessary because the lesions that were treated were:
Show Topical Anesthesia Variable?: Yes
Detail Level: Simple
Spray Paint Text: The liquid nitrogen was applied to the skin utilizing a spray paint frosting technique.
Post-Care Instructions: I reviewed with the patient in detail post-care instructions. Patient is to wear sunprotection, and avoid picking at any of the treated lesions. Pt may apply Vaseline to crusted or scabbing areas.
Medical Necessity Information: It is in your best interest to select a reason for this procedure from the list below. All of these items fulfill various CMS LCD requirements except the new and changing color options.
Consent: The patient's consent was obtained including but not limited to risks of crusting, scabbing, blistering, scarring, darker or lighter pigmentary change, recurrence, incomplete removal and infection.
Number Of Freeze-Thaw Cycles: 2 freeze-thaw cycles
Duration Of Freeze Thaw-Cycle (Seconds): 0
